# Patient Record
Sex: MALE | Race: WHITE | NOT HISPANIC OR LATINO | Employment: OTHER | URBAN - METROPOLITAN AREA
[De-identification: names, ages, dates, MRNs, and addresses within clinical notes are randomized per-mention and may not be internally consistent; named-entity substitution may affect disease eponyms.]

---

## 2017-05-01 ENCOUNTER — RECORDS - HEALTHEAST (OUTPATIENT)
Dept: LAB | Facility: CLINIC | Age: 72
End: 2017-05-01

## 2017-05-01 LAB
CHOLEST SERPL-MCNC: 146 MG/DL
FASTING STATUS PATIENT QL REPORTED: NORMAL
HDLC SERPL-MCNC: 42 MG/DL
LDLC SERPL CALC-MCNC: 74 MG/DL
TRIGL SERPL-MCNC: 148 MG/DL

## 2017-11-13 ENCOUNTER — RECORDS - HEALTHEAST (OUTPATIENT)
Dept: LAB | Facility: CLINIC | Age: 72
End: 2017-11-13

## 2017-11-13 LAB — PSA SERPL-MCNC: 1.1 NG/ML (ref 0–6.5)

## 2018-02-06 ENCOUNTER — RECORDS - HEALTHEAST (OUTPATIENT)
Dept: LAB | Facility: CLINIC | Age: 73
End: 2018-02-06

## 2018-02-06 LAB
ANION GAP SERPL CALCULATED.3IONS-SCNC: 10 MMOL/L (ref 5–18)
BUN SERPL-MCNC: 25 MG/DL (ref 8–28)
CALCIUM SERPL-MCNC: 10 MG/DL (ref 8.5–10.5)
CHLORIDE BLD-SCNC: 104 MMOL/L (ref 98–107)
CO2 SERPL-SCNC: 26 MMOL/L (ref 22–31)
CREAT SERPL-MCNC: 1.25 MG/DL (ref 0.7–1.3)
GFR SERPL CREATININE-BSD FRML MDRD: 56 ML/MIN/1.73M2
GLUCOSE BLD-MCNC: 156 MG/DL (ref 70–125)
POTASSIUM BLD-SCNC: 4.2 MMOL/L (ref 3.5–5)
SODIUM SERPL-SCNC: 140 MMOL/L (ref 136–145)

## 2018-05-07 ENCOUNTER — RECORDS - HEALTHEAST (OUTPATIENT)
Dept: LAB | Facility: CLINIC | Age: 73
End: 2018-05-07

## 2018-05-07 LAB
ANION GAP SERPL CALCULATED.3IONS-SCNC: 13 MMOL/L (ref 5–18)
BUN SERPL-MCNC: 28 MG/DL (ref 8–28)
CALCIUM SERPL-MCNC: 9.8 MG/DL (ref 8.5–10.5)
CHLORIDE BLD-SCNC: 104 MMOL/L (ref 98–107)
CHOLEST SERPL-MCNC: 162 MG/DL
CO2 SERPL-SCNC: 23 MMOL/L (ref 22–31)
CREAT SERPL-MCNC: 1.21 MG/DL (ref 0.7–1.3)
FASTING STATUS PATIENT QL REPORTED: YES
GFR SERPL CREATININE-BSD FRML MDRD: 59 ML/MIN/1.73M2
GLUCOSE BLD-MCNC: 93 MG/DL (ref 70–125)
HDLC SERPL-MCNC: 52 MG/DL
LDLC SERPL CALC-MCNC: 85 MG/DL
POTASSIUM BLD-SCNC: 4.7 MMOL/L (ref 3.5–5)
SODIUM SERPL-SCNC: 140 MMOL/L (ref 136–145)
TRIGL SERPL-MCNC: 124 MG/DL

## 2018-09-12 ENCOUNTER — RECORDS - HEALTHEAST (OUTPATIENT)
Dept: LAB | Facility: CLINIC | Age: 73
End: 2018-09-12

## 2018-09-12 LAB
ANION GAP SERPL CALCULATED.3IONS-SCNC: 12 MMOL/L (ref 5–18)
BUN SERPL-MCNC: 21 MG/DL (ref 8–28)
CALCIUM SERPL-MCNC: 10.4 MG/DL (ref 8.5–10.5)
CHLORIDE BLD-SCNC: 104 MMOL/L (ref 98–107)
CO2 SERPL-SCNC: 24 MMOL/L (ref 22–31)
CREAT SERPL-MCNC: 1.15 MG/DL (ref 0.7–1.3)
GFR SERPL CREATININE-BSD FRML MDRD: >60 ML/MIN/1.73M2
GLUCOSE BLD-MCNC: 75 MG/DL (ref 70–125)
POTASSIUM BLD-SCNC: 4.6 MMOL/L (ref 3.5–5)
SODIUM SERPL-SCNC: 140 MMOL/L (ref 136–145)

## 2019-07-03 ENCOUNTER — RECORDS - HEALTHEAST (OUTPATIENT)
Dept: LAB | Facility: CLINIC | Age: 74
End: 2019-07-03

## 2019-07-03 LAB
ALBUMIN SERPL-MCNC: 4.1 G/DL (ref 3.5–5)
ALP SERPL-CCNC: 41 U/L (ref 45–120)
ALT SERPL W P-5'-P-CCNC: 14 U/L (ref 0–45)
ANION GAP SERPL CALCULATED.3IONS-SCNC: 12 MMOL/L (ref 5–18)
AST SERPL W P-5'-P-CCNC: 15 U/L (ref 0–40)
BILIRUB SERPL-MCNC: 0.2 MG/DL (ref 0–1)
BUN SERPL-MCNC: 27 MG/DL (ref 8–28)
CALCIUM SERPL-MCNC: 9.9 MG/DL (ref 8.5–10.5)
CHLORIDE BLD-SCNC: 107 MMOL/L (ref 98–107)
CHOLEST SERPL-MCNC: 144 MG/DL
CO2 SERPL-SCNC: 24 MMOL/L (ref 22–31)
CREAT SERPL-MCNC: 1.28 MG/DL (ref 0.7–1.3)
FASTING STATUS PATIENT QL REPORTED: ABNORMAL
GFR SERPL CREATININE-BSD FRML MDRD: 55 ML/MIN/1.73M2
GLUCOSE BLD-MCNC: 92 MG/DL (ref 70–125)
HDLC SERPL-MCNC: 50 MG/DL
LDLC SERPL CALC-MCNC: 59 MG/DL
POTASSIUM BLD-SCNC: 4.3 MMOL/L (ref 3.5–5)
PROT SERPL-MCNC: 6.4 G/DL (ref 6–8)
PSA SERPL-MCNC: 0.9 NG/ML (ref 0–6.5)
SODIUM SERPL-SCNC: 143 MMOL/L (ref 136–145)
TRIGL SERPL-MCNC: 174 MG/DL

## 2019-10-15 ENCOUNTER — AMBULATORY - HEALTHEAST (OUTPATIENT)
Dept: PALLIATIVE MEDICINE | Facility: OTHER | Age: 74
End: 2019-10-15

## 2019-10-15 DIAGNOSIS — M70.61 TROCHANTERIC BURSITIS, RIGHT HIP: ICD-10-CM

## 2019-11-06 ENCOUNTER — RECORDS - HEALTHEAST (OUTPATIENT)
Dept: ADMINISTRATIVE | Facility: OTHER | Age: 74
End: 2019-11-06

## 2019-11-12 ENCOUNTER — COMMUNICATION - HEALTHEAST (OUTPATIENT)
Dept: PALLIATIVE MEDICINE | Facility: OTHER | Age: 74
End: 2019-11-12

## 2019-11-13 ENCOUNTER — HOSPITAL ENCOUNTER (OUTPATIENT)
Dept: PALLIATIVE MEDICINE | Facility: OTHER | Age: 74
Discharge: HOME | End: 2019-11-13
Attending: PAIN MEDICINE | Admitting: PAIN MEDICINE

## 2019-11-13 DIAGNOSIS — M79.18 MYOFASCIAL PAIN: ICD-10-CM

## 2019-11-13 DIAGNOSIS — M71.9 BURSITIS: ICD-10-CM

## 2019-11-13 RX ORDER — FEXOFENADINE HCL 180 MG/1
180 TABLET ORAL DAILY
Status: SHIPPED | COMMUNITY
Start: 2019-11-13

## 2019-11-13 RX ORDER — POLYETHYLENE GLYCOL 3350 17 G/17G
17 POWDER, FOR SOLUTION ORAL DAILY PRN
Status: SHIPPED | COMMUNITY
Start: 2019-11-13

## 2019-11-13 RX ORDER — LAMOTRIGINE 150 MG/1
150 TABLET ORAL DAILY
Status: SHIPPED | COMMUNITY
Start: 2019-11-13

## 2019-11-13 RX ORDER — TRAMADOL HYDROCHLORIDE 50 MG/1
50 TABLET ORAL EVERY 6 HOURS PRN
Status: SHIPPED | COMMUNITY
Start: 2019-11-13

## 2019-11-13 RX ORDER — LORAZEPAM 1 MG/1
1 TABLET ORAL DAILY PRN
Status: SHIPPED | COMMUNITY
Start: 2019-11-13

## 2019-11-13 RX ORDER — LISINOPRIL 5 MG/1
5 TABLET ORAL DAILY
Status: SHIPPED | COMMUNITY
Start: 2019-11-13

## 2019-11-13 RX ORDER — TRAZODONE HYDROCHLORIDE 100 MG/1
100 TABLET ORAL AT BEDTIME
Status: SHIPPED | COMMUNITY
Start: 2019-11-13

## 2019-11-13 RX ORDER — ROSUVASTATIN CALCIUM 20 MG/1
20 TABLET, COATED ORAL AT BEDTIME
Status: SHIPPED | COMMUNITY
Start: 2019-11-13

## 2019-11-13 RX ORDER — FENOFIBRATE 145 MG/1
145 TABLET, COATED ORAL DAILY
Status: SHIPPED | COMMUNITY
Start: 2019-11-13

## 2019-11-13 RX ORDER — DULOXETIN HYDROCHLORIDE 60 MG/1
60 CAPSULE, DELAYED RELEASE ORAL DAILY
Status: SHIPPED | COMMUNITY
Start: 2019-11-13

## 2019-11-13 ASSESSMENT — MIFFLIN-ST. JEOR: SCORE: 1554.54

## 2019-11-14 ENCOUNTER — COMMUNICATION - HEALTHEAST (OUTPATIENT)
Dept: PALLIATIVE MEDICINE | Facility: OTHER | Age: 74
End: 2019-11-14

## 2019-11-27 ENCOUNTER — COMMUNICATION - HEALTHEAST (OUTPATIENT)
Dept: PALLIATIVE MEDICINE | Facility: OTHER | Age: 74
End: 2019-11-27

## 2019-12-04 ENCOUNTER — HOSPITAL ENCOUNTER (OUTPATIENT)
Dept: PALLIATIVE MEDICINE | Facility: OTHER | Age: 74
Discharge: HOME | End: 2019-12-04
Attending: PAIN MEDICINE | Admitting: PAIN MEDICINE

## 2019-12-04 DIAGNOSIS — M16.9 OSTEOARTHRITIS OF HIP: ICD-10-CM

## 2019-12-04 DIAGNOSIS — M79.18 MYOFASCIAL PAIN SYNDROME: ICD-10-CM

## 2019-12-04 ASSESSMENT — MIFFLIN-ST. JEOR: SCORE: 1554.54

## 2019-12-05 ENCOUNTER — COMMUNICATION - HEALTHEAST (OUTPATIENT)
Dept: PALLIATIVE MEDICINE | Facility: OTHER | Age: 74
End: 2019-12-05

## 2019-12-12 ENCOUNTER — HOSPITAL ENCOUNTER (OUTPATIENT)
Dept: PALLIATIVE MEDICINE | Facility: OTHER | Age: 74
Discharge: HOME | End: 2019-12-12
Attending: FAMILY MEDICINE

## 2019-12-12 DIAGNOSIS — M70.61 TROCHANTERIC BURSITIS OF RIGHT HIP: ICD-10-CM

## 2019-12-12 DIAGNOSIS — G89.4 CHRONIC PAIN SYNDROME: ICD-10-CM

## 2019-12-12 DIAGNOSIS — Z98.890 S/P LUMBAR DISCECTOMY: ICD-10-CM

## 2019-12-12 RX ORDER — FAMOTIDINE 10 MG
10 TABLET ORAL 2 TIMES DAILY
Status: SHIPPED | COMMUNITY
Start: 2019-12-12

## 2019-12-12 RX ORDER — NAPROXEN SODIUM 220 MG
220 TABLET ORAL 2 TIMES DAILY WITH MEALS
Status: SHIPPED | COMMUNITY
Start: 2019-12-12

## 2019-12-12 ASSESSMENT — MIFFLIN-ST. JEOR: SCORE: 1554.54

## 2019-12-14 LAB
6MAM UR QL: NOT DETECTED
7AMINOCLONAZEPAM UR QL: NOT DETECTED
A-OH ALPRAZ UR QL: NOT DETECTED
ALPRAZ UR QL: NOT DETECTED
AMPHET UR QL SCN: NOT DETECTED
BARBITURATES UR QL: NOT DETECTED
BUPRENORPHINE UR QL: NOT DETECTED
BZE UR QL: NOT DETECTED
CARBOXYTHC UR QL: NOT DETECTED
CARISOPRODOL UR QL: NOT DETECTED
CLONAZEPAM UR QL: NOT DETECTED
CODEINE UR QL: NOT DETECTED
CREAT UR-MCNC: 101.8 MG/DL (ref 20–400)
DIAZEPAM UR QL: NOT DETECTED
ETHYL GLUCURONIDE UR QL: NOT DETECTED
FENTANYL UR QL: NOT DETECTED
HYDROCODONE UR QL: NOT DETECTED
HYDROMORPHONE UR QL: NOT DETECTED
LORAZEPAM UR QL: NOT DETECTED
MDA UR QL: NOT DETECTED
MDEA UR QL: NOT DETECTED
MDMA UR QL: NOT DETECTED
ME-PHENIDATE UR QL: NOT DETECTED
MEPERIDINE UR QL: NOT DETECTED
METHADONE UR QL: NOT DETECTED
METHAMPHET UR QL: NOT DETECTED
MIDAZOLAM UR QL SCN: NOT DETECTED
MORPHINE UR QL: NOT DETECTED
NORBUPRENORPHINE UR QL CFM: NOT DETECTED
NORDIAZEPAM UR QL: NOT DETECTED
NORFENTANYL UR QL: NOT DETECTED
NORHYDROCODONE UR QL CFM: NOT DETECTED
NOROXYCODONE UR QL CFM: NOT DETECTED
NOROXYMORPHONE UR QL SCN: NOT DETECTED
OXAZEPAM UR QL: NOT DETECTED
OXYCODONE UR QL: NOT DETECTED
OXYMORPHONE UR QL: NOT DETECTED
PATHOLOGY STUDY: NORMAL
PCP UR QL: NOT DETECTED
PHENTERMINE UR QL: NOT DETECTED
PROPOXYPH UR QL: NOT DETECTED
SERVICE CMNT-IMP: NORMAL
TAPENTADOL UR QL SCN: NOT DETECTED
TAPENTADOL UR QL SCN: NOT DETECTED
TEMAZEPAM UR QL: NOT DETECTED
TRAMADOL UR QL: PRESENT
ZOLPIDEM UR QL: NOT DETECTED

## 2019-12-30 ENCOUNTER — RECORDS - HEALTHEAST (OUTPATIENT)
Dept: ADMINISTRATIVE | Facility: OTHER | Age: 74
End: 2019-12-30

## 2020-01-02 ENCOUNTER — COMMUNICATION - HEALTHEAST (OUTPATIENT)
Dept: PALLIATIVE MEDICINE | Facility: OTHER | Age: 75
End: 2020-01-02

## 2020-01-07 ENCOUNTER — HOSPITAL ENCOUNTER (OUTPATIENT)
Dept: PALLIATIVE MEDICINE | Facility: OTHER | Age: 75
Discharge: HOME | End: 2020-01-07
Attending: PAIN MEDICINE | Admitting: PAIN MEDICINE

## 2020-01-07 DIAGNOSIS — M16.9 OSTEOARTHRITIS OF HIP: ICD-10-CM

## 2020-01-07 DIAGNOSIS — M46.1 SACROILIITIS (H): ICD-10-CM

## 2020-01-07 ASSESSMENT — MIFFLIN-ST. JEOR: SCORE: 1554.54

## 2020-01-08 ENCOUNTER — COMMUNICATION - HEALTHEAST (OUTPATIENT)
Dept: PALLIATIVE MEDICINE | Facility: OTHER | Age: 75
End: 2020-01-08

## 2020-01-15 ENCOUNTER — AMBULATORY - HEALTHEAST (OUTPATIENT)
Dept: PALLIATIVE MEDICINE | Facility: OTHER | Age: 75
End: 2020-01-15

## 2020-01-15 ENCOUNTER — RECORDS - HEALTHEAST (OUTPATIENT)
Dept: ADMINISTRATIVE | Facility: OTHER | Age: 75
End: 2020-01-15

## 2020-02-06 ENCOUNTER — HOSPITAL ENCOUNTER (OUTPATIENT)
Dept: PALLIATIVE MEDICINE | Facility: OTHER | Age: 75
Discharge: HOME | End: 2020-02-06
Attending: PHYSICIAN ASSISTANT

## 2020-02-06 DIAGNOSIS — M70.62 TROCHANTERIC BURSITIS OF BOTH HIPS: ICD-10-CM

## 2020-02-06 DIAGNOSIS — M79.18 MYALGIA, OTHER SITE: ICD-10-CM

## 2020-02-06 DIAGNOSIS — G89.4 CHRONIC PAIN SYNDROME: ICD-10-CM

## 2020-02-06 DIAGNOSIS — M70.61 TROCHANTERIC BURSITIS OF BOTH HIPS: ICD-10-CM

## 2020-02-06 ASSESSMENT — MIFFLIN-ST. JEOR: SCORE: 1577.22

## 2020-02-19 ENCOUNTER — HOSPITAL ENCOUNTER (OUTPATIENT)
Dept: PHYSICAL MEDICINE AND REHAB | Facility: CLINIC | Age: 75
Discharge: HOME | End: 2020-02-19
Attending: PHYSICIAN ASSISTANT

## 2020-02-19 DIAGNOSIS — M70.61 TROCHANTERIC BURSITIS OF BOTH HIPS: ICD-10-CM

## 2020-02-19 DIAGNOSIS — M70.62 TROCHANTERIC BURSITIS OF BOTH HIPS: ICD-10-CM

## 2020-02-19 DIAGNOSIS — M79.18 MYALGIA, OTHER SITE: ICD-10-CM

## 2020-02-20 ENCOUNTER — COMMUNICATION - HEALTHEAST (OUTPATIENT)
Dept: PALLIATIVE MEDICINE | Facility: OTHER | Age: 75
End: 2020-02-20

## 2020-07-09 ENCOUNTER — RECORDS - HEALTHEAST (OUTPATIENT)
Dept: LAB | Facility: CLINIC | Age: 75
End: 2020-07-09

## 2020-07-09 LAB
ALBUMIN SERPL-MCNC: 3.9 G/DL (ref 3.5–5)
ALP SERPL-CCNC: 58 U/L (ref 45–120)
ALT SERPL W P-5'-P-CCNC: 12 U/L (ref 0–45)
ANION GAP SERPL CALCULATED.3IONS-SCNC: 10 MMOL/L (ref 5–18)
AST SERPL W P-5'-P-CCNC: 17 U/L (ref 0–40)
BILIRUB SERPL-MCNC: 0.3 MG/DL (ref 0–1)
BUN SERPL-MCNC: 22 MG/DL (ref 8–28)
CALCIUM SERPL-MCNC: 9.8 MG/DL (ref 8.5–10.5)
CHLORIDE BLD-SCNC: 105 MMOL/L (ref 98–107)
CHOLEST SERPL-MCNC: 146 MG/DL
CO2 SERPL-SCNC: 26 MMOL/L (ref 22–31)
CREAT SERPL-MCNC: 1.44 MG/DL (ref 0.7–1.3)
FASTING STATUS PATIENT QL REPORTED: NORMAL
GFR SERPL CREATININE-BSD FRML MDRD: 48 ML/MIN/1.73M2
GLUCOSE BLD-MCNC: 96 MG/DL (ref 70–125)
HDLC SERPL-MCNC: 44 MG/DL
LDLC SERPL CALC-MCNC: 73 MG/DL
POTASSIUM BLD-SCNC: 4.6 MMOL/L (ref 3.5–5)
PROT SERPL-MCNC: 6.5 G/DL (ref 6–8)
PSA SERPL-MCNC: 1.5 NG/ML (ref 0–6.5)
SODIUM SERPL-SCNC: 141 MMOL/L (ref 136–145)
TRIGL SERPL-MCNC: 146 MG/DL

## 2021-05-25 ENCOUNTER — RECORDS - HEALTHEAST (OUTPATIENT)
Dept: ADMINISTRATIVE | Facility: CLINIC | Age: 76
End: 2021-05-25

## 2021-05-26 ENCOUNTER — RECORDS - HEALTHEAST (OUTPATIENT)
Dept: LAB | Facility: CLINIC | Age: 76
End: 2021-05-26

## 2021-05-26 LAB
ANION GAP SERPL CALCULATED.3IONS-SCNC: 11 MMOL/L (ref 5–18)
BUN SERPL-MCNC: 19 MG/DL (ref 8–28)
CALCIUM SERPL-MCNC: 9.4 MG/DL (ref 8.5–10.5)
CHLORIDE BLD-SCNC: 105 MMOL/L (ref 98–107)
CO2 SERPL-SCNC: 25 MMOL/L (ref 22–31)
CREAT SERPL-MCNC: 1.17 MG/DL (ref 0.7–1.3)
GFR SERPL CREATININE-BSD FRML MDRD: 60 ML/MIN/1.73M2
GLUCOSE BLD-MCNC: 99 MG/DL (ref 70–125)
POTASSIUM BLD-SCNC: 4.4 MMOL/L (ref 3.5–5)
SODIUM SERPL-SCNC: 141 MMOL/L (ref 136–145)

## 2021-05-27 ENCOUNTER — RECORDS - HEALTHEAST (OUTPATIENT)
Dept: ADMINISTRATIVE | Facility: CLINIC | Age: 76
End: 2021-05-27

## 2021-05-28 ENCOUNTER — RECORDS - HEALTHEAST (OUTPATIENT)
Dept: ADMINISTRATIVE | Facility: CLINIC | Age: 76
End: 2021-05-28

## 2021-05-29 ENCOUNTER — RECORDS - HEALTHEAST (OUTPATIENT)
Dept: ADMINISTRATIVE | Facility: CLINIC | Age: 76
End: 2021-05-29

## 2021-05-30 ENCOUNTER — RECORDS - HEALTHEAST (OUTPATIENT)
Dept: ADMINISTRATIVE | Facility: CLINIC | Age: 76
End: 2021-05-30

## 2021-05-31 ENCOUNTER — RECORDS - HEALTHEAST (OUTPATIENT)
Dept: ADMINISTRATIVE | Facility: CLINIC | Age: 76
End: 2021-05-31

## 2021-06-03 VITALS — BODY MASS INDEX: 30.13 KG/M2 | WEIGHT: 192 LBS | HEIGHT: 67 IN

## 2021-06-03 NOTE — PROGRESS NOTES
Injection - Other  Date/Time: 11/13/2019 9:25 AM  Performed by: Glenn Johnson MD  Authorized by: Glenn Johnson MD   Comments:     TRIGGER POINT INJECTION  Performed on: 11/13/2019    Mr. Ferguson is a 74-year-old man who has myofascial pain in the greater trochanteric bursa areas bilaterally, more on the right.  He is referred here today for trigger point injection.    Pre Procedure Diagnosis:  Myofascial pain  Vital Signs:  As per intra-procedure documentation    The procedure was discussed with Rayshawn Ferguson in detail along with the attendant risks, including but not limited to: nerve injury, infection, bleeding, allergic reaction, or worsening of pain.  Informed consent was obtained and patient elected to proceed.    After informed consent was obtained the patient was placed in a left lateral decubitus position on the examination table.  The right greater trochanter area was prepped sterilely with alcohol.  A one 1/2 inch #25 gauge needle was used.  There were injections made in a fanlike distribution over the right greater trochanter area.  Patient was then turned to a right lateral decubitus position in the left lateral hip area was prepped sterilely.  Injection was made again in a fanlike distribution over the left greater trochanter.  A total of 10 mL of 0.25% Marcaine with 10 mg of Decadron was used in divided doses.    The patient tolerated the procedure well.  There were no complications.      Pre Procedure Pain Scale: 8  Pain Score:   5(post procedure)    If Rayshawn Ferguson has any questions or concerns after discharge, he was instructed to call us.

## 2021-06-03 NOTE — PATIENT INSTRUCTIONS - HE
POST PROCEDURE INSTRUCTIONS  PROCEDURE DONE TODAY:TRIGGER POINT INJECTIONS    Rest today. Resume activity tomorrow as able.  Patient demonstrates the ability to ambulate independently with a steady gait at the time of discharge.      It is not unusual to have a temporary increase in your pain after a procedure. You can ice the area 24-48 hrs. 15 min at a time.      You received a steroid injection. This medication can take 2-7 days to take effect. Some temporary side effects include:    Heartburn    Flushed-red face    Insomnia-trouble sleeping-jitters    Diabetics may see a rise in blood sugar for a few days    Low back or SI joint (sacroiliac) injection: you may experience numbness in one or both legs. Please walk with help. This is temporary and will wear off in a few hours. Do not drive if you are tingling, numbness or weakness in your hands/arms or legs/feet.    Headache:  If you experience a headache after a lumbar epidural injection, lie down and drink fluids (especially caffeine). The headache will go away gradually. If it persists notify our clinic.    Fever: call if fever 100 or over, redness/warmth/swelling over the injection site.    No public hot tubs/pools for a couple days    Physical therapy: check with pain center provider regarding resuming therapy.    Monitor your response to the injection and report this at your next visit.    If you have been referred for a procedure only, please call your referring provider for a follow up.    IF YOU PLAN TO GET A FLU SHOT YOU MUST WAIT 2 WEEKS AFTER THIS INJECTION.      CALL IF ANY QUESTIONS [phone]

## 2021-06-03 NOTE — PATIENT INSTRUCTIONS - HE
POST PROCEDURE INSTRUCTIONS  PROCEDURE DONE TODAY: Trigger Point Injections to bilateral hip     Rest today. Resume activity tomorrow as able.  Patient demonstrates the ability to ambulate independently with a steady gait at the time of discharge.      It is not unusual to have a temporary increase in your pain after a procedure. You can ice the area 24-48 hrs. 15 min at a time.      You received a steroid injection. This medication can take 2-7 days to take effect. Some temporary side effects include:    Heartburn    Flushed-red face    Insomnia-trouble sleeping-jitters    Diabetics may see a rise in blood sugar for a few days    Low back or SI joint (sacroiliac) injection: you may experience numbness in one or both legs. Please walk with help. This is temporary and will wear off in a few hours. Do not drive if you are tingling, numbness or weakness in your hands/arms or legs/feet.    Headache:  If you experience a headache after a lumbar epidural injection, lie down and drink fluids (especially caffeine). The headache will go away gradually. If it persists notify our clinic.    Fever: call if fever 100 or over, redness/warmth/swelling over the injection site.    No public hot tubs/pools for a couple days    Physical therapy: check with pain center provider regarding resuming therapy.    Monitor your response to the injection and report this at your next visit.    If you have been referred for a procedure only, please call your referring provider for a follow up.    IF YOU PLAN TO GET A FLU SHOT YOU MUST WAIT 2 WEEKS AFTER THIS INJECTION.      CALL IF ANY QUESTIONS [phone]

## 2021-06-03 NOTE — PROGRESS NOTES
Patient her with bilateral hip with greater pain on right along with thigh. Will have a bilateral bursa injection today.

## 2021-06-04 ENCOUNTER — RECORDS - HEALTHEAST (OUTPATIENT)
Dept: ADMINISTRATIVE | Facility: CLINIC | Age: 76
End: 2021-06-04

## 2021-06-04 VITALS — BODY MASS INDEX: 30.85 KG/M2 | WEIGHT: 197 LBS

## 2021-06-04 VITALS — WEIGHT: 192 LBS | HEIGHT: 67 IN | BODY MASS INDEX: 30.13 KG/M2

## 2021-06-04 VITALS — HEIGHT: 67 IN | WEIGHT: 192 LBS | BODY MASS INDEX: 30.13 KG/M2

## 2021-06-04 VITALS — WEIGHT: 197 LBS | HEIGHT: 67 IN | BODY MASS INDEX: 30.92 KG/M2

## 2021-06-04 NOTE — PROGRESS NOTES
[facility]  New Patient Consultation        HPI  Rayshawn Ferguson 74 y.o. is here today, sent to me by  to discuss   Chief Complaint   Patient presents with     Hip Pain     Comorbid conditions include history of MI 1997 stent RCA, ASHD, HTN, IBS, duodenal ulcer, hyperipidemia, renal calculi, depression, L ankle fracture 05/2018, lumbar surgery.    Pain Story:     Reports pain location is bilateral hips and right sided is worse, duration over 1 year.  He reports he cannot lay on his side very long without pain.  He denies work or auto injury which started his pain.  He reports pain as constant, severe that is sharp, dull, aching, shooting, stabbing, localized, deep, superficial, pressure.  He reports pain interferes with daily activities, sleep, recreational activities, and concentration.    Hip pain is worse with walking and going down stairs, has to use left leg to step.  He states he broke his left ankle non-displaced and 6 weeks had walking boot and painful to step down, followed orthopedics and wonders if this was an aggravating factor. Denies knee or ankle joint pian.  He states x-ray and CT of right hip were negative.  He states he believes he has been assessed for leg length discrepancy with pelvic x-ray through ortho. He states he also wonders about his SI joint; he states in the morning his pain hurts in his lower back more on right side than left. He denies previous SI Joint injections.  He describes 3 previous lumbar surgeries, microdiscectomy believes at right L4-5 and L3-4 levels with Dr. Dotson and denies hardware or fusions.  He states he has numbness in bilateral 4th/5th toes, saw podiatrist and was given inserts.  He states when he is in certain positions, sitting, or doesn't wear shoes using outer part of his foot and toes they get numb.      He saw [facility] in July as he was unable to walk on his right leg.  Dr. Guerra at [facility] diagnosed him with  bursitis and was given injection which did help a little.  He has since had 2 other bursa injections with Dr. Johnson and is scheduled for repeat procedure in January.  He states he doesn't want to mask the pain with medications but rather figure out what is causing it.    Alleviating factors: Medications, sitting down  Aggravating factors: Walking, laying down, going upstairs, going downstairs, weather change  Pain level today: On a scale of 1-10, the patient rates their pain at a 7.  Pain ratings vary between 6/10 and 9/10.  Function Ratin meaning pain affects the following:     3 - Enjoy     4 - Work, Enjoy     5 - Active, Mood, Work, Enjoy     6 - Sleep, Active, Mood, Work, Enjoy     7 - Walk, Sleep Active, Mood, Work, Enjoy     8 - Relate, Walk, Sleep, Active, Mood, Work, Enjoy  Associated Symptoms: Denies weight loss, weight gain, swelling, fever/chills, bowel or bladder incontinence.  Previous Diagnostics & Treatments for Pain:  Surgery - Denies  Injections - Bilateral hip injections bursa 19 & 19 with Dr. Johnson.  He also had a couple through [facility] in July which allowed pain to reduce.  Gives about 40% pain reduction.   Multiple LESI injections [id number]  Imaging - see below MRI right hip and x-ray bilateral hips standing  Physical Therapy - Yes [facility] for bursa last year  Chiropractor/Acupuncture - Denies  Massage - Denies  TENS or bracing - Denies  Pain Psychology or biofeedback - Denies  Other - heat/ice, home exercise program at gym does track up to 1/2 mile, then treadmill 1/2 mile.  When he steps down from treadmill his right leg is painful.      Social History: Single, retired x-ray tech.  He reports living along in an apartment.  Has support from brother and sister.  Independent ADLs, painful to walk.  Denies use of AD except for when pain was severe in July he was using a crutch to ambulate.    Patient set goals today in the office to achieve with the pain centers help. They are:    1.  Pain relief  2. Sleeping on sides, stepping down a step with less pain    Past Medical History:   Diagnosis Date     Depression      Hyperlipidemia      Hypertension      Injection (erythema) 2019    Steroid injectiom in bilateral hps     Kidney stones      Kidney stones      Panic attacks      Past Surgical History:   Procedure Laterality Date     EYE SURGERY       SPINE SURGERY         Social  Family History   Problem Relation Age of Onset     Diabetes Mother      Depression Mother      Cancer Father      Diabetes Sister      Depression Sister      Depression Brother      Cancer Brother      Social History     Tobacco Use   Smoking Status Former Smoker     Social History     Substance and Sexual Activity   Alcohol Use Not on file     Social History     Substance and Sexual Activity   Drug Use Not on file     Social History     Substance and Sexual Activity   Sexual Activity Not on file       Chemical Dependency History: Patient Denies tobacco use quit in 1983 was smoking 2 ppd x 2 years, denies alcohol use, illicit substance use including THC.  Denies any legal issues related to substance use.    Psychiatric History:  Depression, anxiety, panic attacks. Taking Duloxetine 60 mg, lamotrigine 150 mg daily, trazodone, and lorazepam 1 mg prn anxiety.  Sees psychiatrist Dr. Henriquez and psychologist.  Denies any suicidal ideation.  Denies any hospitalizations for mental illness.    Pertinent Pain Medications:  He currently takes Aleve 220 mg prn, duloxetine 60 mg daily (does not report helpful for pain), tramadol 50 mg prn 2 tabs at night after exercising, #18 tabs for 30 days doesn't take during the day, oxycodone 5/325 mg prn last prescribed 05/2019, and lorazepam 1 mg prn for anxiety/panic attacks or if cannot sleep refills #30 which lasts every few months, trazodone 200 mg daily.     Previously tried medications:    NSAIDs: Ibuprofen, Aleve,     Acetaminophen: APAP    Antidepressants: effexor not  helpful    Gabapentinoids: Denies    Opioids: hydrocodone, oxycodone, tramadol helpful    Topicals: OTC biofreeze does give a little relief, sounds like Voltaren Gel     Supplements: denies    Muscle Relaxants: denies    Other: Medrol dose packs several different times don't help      Current Outpatient Medications:      aspirin 81 MG EC tablet, Take 81 mg by mouth daily., Disp: , Rfl:      cyanocobalamin 1000 MCG tablet, Take 1,000 mcg by mouth daily., Disp: , Rfl:      DULoxetine (CYMBALTA) 60 MG capsule, Take 60 mg by mouth daily., Disp: , Rfl:      fenofibrate (TRICOR) 145 MG tablet, Take 145 mg by mouth daily., Disp: , Rfl:      fexofenadine (ALLEGRA) 180 MG tablet, Take 180 mg by mouth daily., Disp: , Rfl:      fluticasone propionate (FLOVENT DISKUS) 50 mcg/actuation diskus inhaler, Inhale 1 puff 2 (two) times a day., Disp: , Rfl:      lamoTRIgine (LAMICTAL) 150 MG tablet, Take 150 mg by mouth daily., Disp: , Rfl:      lisinopril (PRINIVIL,ZESTRIL) 5 MG tablet, Take 5 mg by mouth daily., Disp: , Rfl:      LORazepam (ATIVAN) 1 MG tablet, Take 1 mg by mouth daily as needed for anxiety (only as needed)., Disp: , Rfl:      methylcellulose (CITRUCEL) Powd, Take 2 g by mouth., Disp: , Rfl:      polyethylene glycol (MIRALAX) 17 gram packet, Take 17 g by mouth daily as needed., Disp: , Rfl:      ranitidine (ZANTAC) 150 MG capsule, Take 150 mg by mouth., Disp: , Rfl:      rosuvastatin (CRESTOR) 20 MG tablet, Take 20 mg by mouth at bedtime., Disp: , Rfl:      traMADol (ULTRAM) 50 mg tablet, Take 50 mg by mouth every 6 (six) hours as needed for pain or other (2 daily)., Disp: , Rfl:      traZODone (DESYREL) 100 MG tablet, Take 100 mg by mouth at bedtime. Take 2 tablets one daily, Disp: , Rfl:       Review of Systems:  12 point systems were reviewed with pt as documented on pt health form of 12/10/2019. ROS was positive for difficulty sleeping, headache, glasses, cataracts, stuffiness, discharge, sinus pain, sore throat,  "snoring, change in appetite, constipation, leg cramping, muscle or joint pain, stiffness, back pain, anxiety, depression  the rest of the systems were pertinent negative.  (General, Cardiac, Pulmonary, Integumentary, Musculoskeletal, Neurological,GI, , GYN, Endocrine, Hematological and Psychological)    Exam  Vitals:    12/12/19 1000   BP: (!) 171/91   Patient Site: Right Arm   Patient Position: Sitting   Pulse: 73   Resp: 18   Weight: 192 lb (87.1 kg)   Height: 5' 7\" (1.702 m)       Constitutional-General:  Well nourished, well developed, well groomed, healthy appearing individual.  Weight is overweight, appears stated age and presents alone.  Psych-Mental Status: A & O in no acute distress. Speech is fluent. Thought process normal. Recent and remote memory are intact.  Attention span and concentration are normal. Displays appropriate mood and affect.   Lymph-cervical lymph system (anterior and posterior) without palpable nodules or tenderness throughout. Supraclavicular chain without palpable nodules or tenderness throughout.   Cardiovascular- Bilateral LE no edema noted.   Musckuloskeletal  Gait:  Gait is abnormal, slight antagia.  Ambulates independently.    Gross Motor: Toe walking, heel walking, and Romberg tests are normal.   Strength:  Bilateral upper and lower extremity strength testing (see below). No atrophy or tone abnormalities noted.   Muscles   Right  Left   Hip Flex   5 5  Hip Abd   5 5    Quads    5 5   Hamstrings   5 5  Dorsiflex   5 5  Plantarflex  5 5  Toe ext   5 5  Toe flex   5 5    Sensation:  No loss of sensation noted to light touch in both upper and lower extremities. No dermatomal abnormal distributions noted.  Spine ROM:  Cervical ROM flexion WNL without pain, extension WNL without pain, rotation right WNL without pain, rotation left WNL without pain. Lumbar ROM flexion WNL without pain, extension WNL without pain, lateral bending right and left WNL without pain.  Provocative Maneuvers: "  Sacroiliac tests including Sulaiman-Brad and stork test negative bilaterally.  , and knee provocative maneuvers are negative.Tinel's test negative bilaterally. Piriformis test negative bilaterally. Facet loading test negative bilaterally. ISLR test was negative bilaterally.   Spine Palpation:  Inspection and palpatory examination of the lumbar spine normal in appearance and non tender to palpation L1-S1 and bilateral SI joints.   Joints: Inspection and palpation examination of the upper/lower extremities is normal in appearance without deformity, ecchymosis, erythema, swelling.  AROM of bilateral hips is intact and WNL with flexion, extension, internal and external rotation, abduction, and adduction.  Tenderness is noted in the right and left greater trochanteric bursa.  Integumentary: no rashes or breaks in the skin, no open wounds. Exposed skin is clean, dry, intact to inspection and palpation.    Lab:  Last UDS on 12/10/2019 was taken today and is pending.      Imaging:  Requesting from [facility], unable to review today.    :  Dated 12/10/2019 was reviewed with the patient    Assessment :  After reviewing the patients chart and physical findings I agree that the patient would benefit from our pain center multidisciplinary treatment approach.   I have discussed with the  patient today the diagnosis of bilateral hip bursitis; patient also questions if he has other etiology of pain such as SI joint or lumbar radicular pain.  On exam, it appears to me a bursitis but will also review [facility] notes as they have further imaging of hip/pelvis and lumbar spine, although her patient report his hip imaging was negative.  It is complicated by the fact that patient has history of 3 lumbar surgeries. Reports he had lumbar MRI done last year, will review in records.  Denies red flag symptoms.  We reviewed the natural progression of this diagnosis.  We discussed possible benefits and detriments of physical therapy, integrative care  such as acupuncture/chiropractor, medication management, behavorial therapy, and other alternative treatments including supplements and diet.  We also discussed future possible treatment options in a stepwise fashion, including interventional pain procedures and injections and possible surgical referral if needed.      Interventions: I discussed risks versus benefits of repeating bursa injection versus trial of right SI joint injection.  Reviewed this procedure today in detail. He is scheduled on 01/02/20 with Dr. Johnson.  Physical Medicine and rehabilitations: Patient has done physical therapy evaluation and treatment for pain control, improvement of function, and assisting the patient to develop a daily routine to support achievement and, where necessary, readjustment of habits and roles according to the patient capacity and life situation.   He is attending gym for exercise regularly.  Medication Management: We discussed medication options to manage the patient. I review with him change in NSAID to meloxicam, diclofenac, etc.for 2-3 week trial to see if any benefit.  Do have to be cautious due to patient's previous cardiac history.  Also discuss other options such as topical compounded creams, supplements for anti-inflammatory properties, and even trial of gabapentin for chronic pain and interrupted sleep at night.  He is taking tramadol sparingly, if Dr. Rahman needs us to take that over will need to review UDT results collected today.    Behavioral Health: We discussed the body mind process of pain. We discussed the importance of cognitive behavioral therapy to help patient to control pain, address any psychiatric condition that may contribute to patient's experience of pain. The patient agreed continue with current behavioral health team including therapist and psychiatrist Dr. Henriquez.    The patient understands that opiate/controlled pain medication is not prescribed during the initial patient consultation  at the pain center. If the patient is on pain medications for chronic pain, the PCP or prescribing provider may choose  to prescribe until the patient presents for follow up at the pain center. Medication prescriptions provided by the pain center will depend on the UA results, safe prescribing practices established by the CDC guidelines and patient response to their current treatment regimen at the follow up visit.      Plan recommended today:    Follow up in 6-8 weeks with Roopa PACKER    Sign a Release of Information for [facility] so we can obtain your records (office notes, imaging) for our next visit    Continue your current regimen of alleviating factors as reviewed today    Please see your current provider for any refill of an opioid prescription; they may choose to provide a refill until we have your urine screen back. They will continue to manage your general health and have requested you see the pain center for pain management. Please discuss any health concerns with your primary care physician.    [place] Precautions are taken with every patient which includes a  report and drug screen. A urine drug screen will be taken today for baseline screening.     Behavioral Therapy- continue with current psychiatrist/therapist    Physical and Occupational Therapy- continue exercise at the gym    PM&R- may consider a referral to Brandon Mead at [facility] for evaluation    Injections- continue with Dr. Johnson as scheduled in January - consider right SI joint versus right hip joint    MTM visit with the pharmacist in the future may be helpful with any medication changes    Non-opoid medical management includes- discussion of different anti-inflammatory trial versus gabapentin trial to help with pain/sleep at night.  Discussed trial of compounding cream topically, supplements, and/or making changes to opioids if conservative options fail.     Education was provided today in regards to the patient's specific diagnosis      Diagnosis  1. Chronic pain syndrome    2. S/P lumbar discectomy    3. Trochanteric bursitis of right hip      Patient reminders:   Diagnostics: UDT/SWAB collected 12/12/2019 and results are pending.  UDT/SWAB:  Patient required a random Urine Drug Testing, due to the need to comply with [place] Model Policy Guidelines and CDC Guideline for the use of any controlled substances. This is to ensure that patient is compliant with treatment, and monitor for risks such as diversion, abuse, or any other aberrant behaviors. Patient is either being considered for or taking a controlled substance. Unexpected findings will be discussed and treatment decision may be adjusted. Testing is being implemented across the board randomly w/o bias related to age, race, gender, socioeconomic status or Rastafari affiliation.     SAFETY REMINDERS  No alcohol while taking controlled substances. Alcohol is not an illegal substance, it is unsafe to use in combination. It is a build up of substances in the body that can be extremely hazardous and may cause respirations to slow to a dangerous rate resulting in hospitalization, brain damage, or death.    Opioid medications have been associated with sharp rise in unintentional overdose and death.  Overdose is a condition characterized by the consumption in excess of a particular drug causing adverse effects. This can happen b/c you are sick, accidentally or intentionally took an extra dose, are on multiple medication that can interact. Someone took your medication and they are not use to the medication.  Symptoms of overdose include:   !breathing slow and shallow, erratic or not at all  !pinpoint pupils, hallucinations  !confusion  !muscle jerks, slack muscles   !extreme sleepiness or loss of alertness   !awake but not able to talk   !face pale or clammy, vomiting, for lighter skinned people, the skin tone turns bluish purple, for darker skinned people, it turns grayish or ashen   If in a  situation where overdose is a concern engage the emergency response system (dial 911).    In one study it was noted that 80% of unintentional overdoses occurred in people who were taking a combination of opioids and benzodiazepines.    Do not sell, loan, borrow or share your opioid medication with anyone. Deaths have occurred as a result of this practice. It is illegal and patients are being prosecuted.     Prevent unexpected access/loss of medication: Keep medication locked. Only carry what you need with you.    The patient agrees to the plan and has no further questions, if questions arise the patient knows to call [phone].     Thank you for this consult and opportunity to assist with this patients care.    Greater than 50% of this 60 minute visit spent in face to face discussion of patient pain symptoms, pain history and treatments, and recommendations for multidisciplinary care approaches including medications, PT/OT, Behavioral Health, integrative care, and interventional treatment options.    Roopa Burgos PA-C  [facility]   [address]  [place] [address]  Ph: [phone]  Fax: [phone]

## 2021-06-04 NOTE — PATIENT INSTRUCTIONS - HE
Plan recommended today:    Follow up in 6-8 weeks with Roopa PACKER    Sign a Release of Information for [facility] so we can obtain your records (office notes, imaging) for our next visit    Continue your current regimen of alleviating factors as reviewed today    Please see your current provider for any refill of an opioid prescription; they may choose to provide a refill until we have your urine screen back. They will continue to manage your general health and have requested you see the pain center for pain management. Please discuss any health concerns with your primary care physician.    [place] Precautions are taken with every patient which includes a  report and drug screen. A urine drug screen will be taken today for baseline screening.     Behavioral Therapy- continue with current psychiatrist/therapist    Physical and Occupational Therapy- continue exercise at the gym    PM&R- may consider a referral to Brandon Mead at [facility] for evaluation    Injections- continue with Dr. Johnson as scheduled in January - consider right SI joint versus right hip joint    MTM visit with the pharmacist in the future may be helpful with any medication changes    Non-opoid medical management includes- discussion of different anti-inflammatory trial versus gabapentin trial to help with pain/sleep at night.  Discussed trial of compounding cream topically, supplements, and/or making changes to opioids if conservative options fail.     Education was provided today in regards to the patient's specific diagnosis

## 2021-06-04 NOTE — PROGRESS NOTES
Patient presents to the clinic today for a follow up with Roopa Burgos PA-C regarding hip pain. Patient describes their pain as sharp, dull, shooting, and pressure. Her/His function score is 7.

## 2021-06-04 NOTE — PROGRESS NOTES
Injection - Other  Date/Time: 12/4/2019 10:10 AM  Performed by: Glenn Johnson MD  Authorized by: Glenn Johnson MD   Comments:     TRIGGER POINT INJECTION TROCHANTERIC BURSA BILATERAL  Performed on: 12/4/2019    Mr. Ferguson is a 74-year-old man with pain over the trochanteric bursa areas bilaterally, greater on the right.  He had injections done which gave him some benefit.  The pain has increased again and he wishes to repeat the injection.  If he does not get good relief of the right hip pain I would recommend a right hip joint injection.    Pre Procedure Diagnosis:  Myofascial pain  Vital Signs:  As per intra-procedure documentation    The procedure was discussed with Rayshawn Ferguson in detail along with the attendant risks, including but not limited to: nerve injury, infection, bleeding, allergic reaction, or worsening of pain.  Informed consent was obtained and patient elected to proceed.    After informed consent was obtained the patient was placed in a left lateral decubitus position on the examination table.  The right lateral hip area was prepped sterilely with alcohol.  A 1-1/2 inch number 25-gauge needle was used.  There were injections made in a fanlike distribution over the right greater trochanter.  The patient was then turned to a right lateral decubitus position and the left trochanteric area was prepped sterilely.  Injections were made again in a fanlike distribution over the left greater trochanter.  A total of 10 mL of 0.25% Marcaine with 10 mg of Decadron was used in divided doses.    The patient tolerated the procedure well.  There were no complications.      Pre Procedure Pain Scale: 6  Pain Score:   7(intermittant; achy pain )    If Rayshawn Ferguson has any questions or concerns after discharge, he was instructed to call us.

## 2021-06-05 NOTE — PATIENT INSTRUCTIONS - HE
POST PROCEDURE INSTRUCTIONS  PROCEDURE DONE TODAY:Bilateral SI Joint injection    Rest today. Resume activity tomorrow as able.  Patient demonstrates the ability to ambulate independently with a steady gait at the time of discharge.      It is not unusual to have a temporary increase in your pain after a procedure. You can ice the area 24-48 hrs. 15 min at a time.      You received a steroid injection. This medication can take 2-7 days to take effect. Some temporary side effects include:    Heartburn    Flushed-red face    Insomnia-trouble sleeping-jitters    Diabetics may see a rise in blood sugar for a few days    Low back or SI joint (sacroiliac) injection: you may experience numbness in one or both legs. Please walk with help. This is temporary and will wear off in a few hours. Do not drive if you are tingling, numbness or weakness in your hands/arms or legs/feet.    Headache:  If you experience a headache after a lumbar epidural injection, lie down and drink fluids (especially caffeine). The headache will go away gradually. If it persists notify our clinic.    Fever: call if fever 100 or over, redness/warmth/swelling over the injection site.    No public hot tubs/pools for a couple days    Physical therapy: check with pain center provider regarding resuming therapy.    Monitor your response to the injection and report this at your next visit.    If you have been referred for a procedure only, please call your referring provider for a follow up.    IF YOU PLAN TO GET A FLU SHOT YOU MUST WAIT 2 WEEKS AFTER THIS INJECTION.      CALL IF ANY QUESTIONS [phone]

## 2021-06-05 NOTE — PATIENT INSTRUCTIONS - HE
Continue current medication plan as prescribed.  The patient is given Toradol 30 mg IM today to treat moderate to severe pain.  Patient denies contraindications to use including allergy/hypersensitivity to NSAIDs, active peptic ulcer disease or history of GI bleeding, anticoagulant medication use, renal disease, pregnancy/nursing, or high risk CV disease.  We discussed potential side effects including but not limited to: abdominal pain, nausea, vomiting, heartburn, constipation/diarrhea, drowsiness, headaches, injection site pain, rash, dizziness, itching, sweating.  Take Toradol 10 mg tab 1 every 6 hours with food for pain for the next 5 days.  Do not take any other NSAIDs with this (aleve, motrin, ibuprofen, etc)  Recommend repeating ultrasound guided bilateral hip bursa injection with Dr[facility] - will order and central scheduling can set up the appointment.  If this is helpful, will continue this treatment every 3-4 months as needed.  If not helpful, call here and I recommend you have a consult with Dr. Brandon Mead [facility] at [facility].  Follow-up as needed with Roopa PACKER to evaluate the above plan of care.

## 2021-06-05 NOTE — PROGRESS NOTES
Received documents from [facility] - attempts at scanning were illegible, so in event that scanning fails again will document the review of records for follow-up visit on 02/06/2020:  Right L3-4 far lateral disk herniation with L3 radiculopathy 09/20/2018 right far lateral foraminal L3-4 microdiscectomy, decompression of right L3 nerve root, partial facetectomy, partial laminotomy.  After failure of steroid injection (05/17/18), ultrasound guided bursa injection 04/2018, and PRP for trochanteric bursitis.  Failed intra-articular right hip injection and right L5-S1 TFESI ( 07/24/2018) and right L3-4 TFESI (08/14/2018).  Previous L4-5 and left L5-S1 decompression.    MRI Right hip without contrast 05/01/2018:  Conclusion:  1. Mild right hip degenerative changes with broad-based blunting and attenuation of the labrum.  2. No stress reaction, osseous contusion, acute fracture or osteonecrosis.  3. Low-grade partial thickness tearing of the right common hamstring origin.    MR Lumbar Spine without Contrast 05/19/2018:  Conclusion: Multilevel disc and facet degeneration with dorsal decompression defects at L5-S1 and L4-5, and specific findings as follows:  1. 3-4 mm right lateral disc herniation at L3-4 with moderate foraminal narrowing an partial effacement of right L3 perineural fat.  2. Mild central and/or subarticular recess stenosis at L4-5 and L3-4 with a 3 mm central disc protrusion at L4-5 and degenerative spondylolisthesis at each level.  3. Moderate bilateral subarticular recess stenosis at L5-S1.  4. Mild bilateral foraminal stenosis at L5-S1 and L4-5  5. Comparison with 10/25/2013 shows that degenerative changes have progressed at multiple levels, the disc herniation on the right at L3-4 new in the interval.

## 2021-06-05 NOTE — PROGRESS NOTES
PAIN CENTER PROGRESS NOTE    Subjective:   Rayshawn Ferguson is a 74 y.o. male who presents for evaluation of bilateral hip joint pain.  See consult note 12/12/2019.    Major issues:  1. Chronic pain syndrome    2. Trochanteric bursitis of both hips    3. Myalgia, other site       Pain rating and description: 6-7/10 intermittent sharp hip pain, bilateral.  Function rated 6.    Radiation of pain: Denies  Gait disturbance: none reported, 1 recent fall (see below).  Associated symptoms: Numbness in bilateral toes 3-4-5, weakness in bilateral hips.  Pain at night.  Denies bowel or bladder incontinence, fever/chills, unexplained weight loss.  Exacerbating factors: Getting up from a sitting or laying position  Alleviating factors: sitting, laying on back  Functional Symptoms: Pain interferes with sleep, walking, work, ADLs, mood (anxious, helpless/hopeless).  Adverse effects of medications: Denies  Current treatment efficacy: Fair  Current treatment compliance: New to pain center.  No aberrant behaviors in chart.      Since the last Pain Center visit, the patient denies any use of anticoagulant medications. Denies any new diagnostic testing, new treatments or new medical conditions, any changes in medications, or any ED/urgent care visits.  States his pain has worsened due to recent fall this week.    States couple days ago was trying to change light in patio and fell down to the ground and couldn't get up due to hip pain.  He landed on knees.  He states right side has always been worse than left.  He has been taking Aleve, he tries not to take tramadol but if cannot sleep at night due to pain he lays on sides hurts and aches.  Tramadol dose is 100 mg before bedtime, doesn't repeat during the night.  Sitting for an hour is painful.  Heat/ice not helpful, OTC topical has tried, and NSAIDs.    SI joint injections on 01/07/2020 helped only for a couple of days. Could feel a slight improvement getting up from sitting  position.  Denies complications. Steroids cause insomnia night after.      We review his records from [facility]:  Received documents from [facility] - attempts at scanning were illegible, so in event that scanning fails again will document the review of records for follow-up visit on 02/06/2020:  Right L3-4 far lateral disk herniation with L3 radiculopathy 09/20/2018 right far lateral foraminal L3-4 microdiscectomy, decompression of right L3 nerve root, partial facetectomy, partial laminotomy.  After failure of steroid injection (05/17/18), ultrasound guided bursa injection 04/2018, and PRP for trochanteric bursitis.  Failed intra-articular right hip injection and right L5-S1 TFESI ( 07/24/2018) and right L3-4 TFESI (08/14/2018).  Previous L4-5 and left L5-S1 decompression.     MRI Right hip without contrast 05/01/2018:  Conclusion:  1. Mild right hip degenerative changes with broad-based blunting and attenuation of the labrum.  2. No stress reaction, osseous contusion, acute fracture or osteonecrosis.  3. Low-grade partial thickness tearing of the right common hamstring origin.     MR Lumbar Spine without Contrast 05/19/2018:  Conclusion: Multilevel disc and facet degeneration with dorsal decompression defects at L5-S1 and L4-5, and specific findings as follows:  1. 3-4 mm right lateral disc herniation at L3-4 with moderate foraminal narrowing an partial effacement of right L3 perineural fat.  2. Mild central and/or subarticular recess stenosis at L4-5 and L3-4 with a 3 mm central disc protrusion at L4-5 and degenerative spondylolisthesis at each level.  3. Moderate bilateral subarticular recess stenosis at L5-S1.  4. Mild bilateral foraminal stenosis at L5-S1 and L4-5  5. Comparison with 10/25/2013 shows that degenerative changes have progressed at multiple levels, the disc herniation on the right at L3-4 new in the interval.    We review the case review discussion from 02/06/2020:  Recommendations for PM&R consult  "with Dr. Brandon Mead, may evaluate for leg length discrepancy.  May evaluate for postural restoration therapy.    Possible trial with CollaGEN supplement through [facility] as had low grade partial thickness tear of hamstring origin.  Possible Frequency Specific Microcurrent therapy    Patient states he would like to pursue again the hip injection under ultrasound as that was the most beneficial; was painful during but gave several months of relief afterwards.   He does not wish to pursue the above recommendations unless that doesn't help.     Review of Systems  Constitutional: Sleep disturbance due to pain.  Denies fever, chills, night sweats, lethargy, weight loss, weight gain  Musculoskeletal: Positive for joint pain and recent fall. Denies muscle pain, back pain  Gastrointestional: Denies difficulty swallowing, change in appetite, abdominal pain, constipation, nausea, vomiting, diarrhea, GERD, fecal incontinence.  Genitourinary: Denies urinary incontinence, dysuria, hematuria, UTI, frequency, hesitancy, change in libido/erectile dysfunction.  Neurologic: Denies headaches, confusion, seizure, weakness, changes in balance, changes in speech.  Psychiatric: Denies depression, anxiety, memory loss, psychoses, suicidal ideation, substance use/abuse.       Objective:     Vitals:    02/06/20 1000   BP: 163/83   Pulse: 69   Weight: 197 lb (89.4 kg)   Height: 5' 7\" (1.702 m)   PainSc:   7   PainLoc: Hip     Physical Exam  Constitutional- General appearance: Normal.  Well developed, uncomfortable, within normal limits of ideal weight and appearance reflects stated age.  No acute distress or pain behaviors noted.   Psychiatric- Judgment and insight: Normal.  Speech: Normal rhythm.  Thought process: Normal.  No abnormal thoughts reported. Alert & Oriented to person, place, and time.  Recent and remote memory: Normal.  Mood and affect: Normal.  Observed mood: concerned.   Respiratory- Breathing is non-labored; normal " rhythm and rate.  Dermatologic- Skin clean, dry and intact to inspection and palpation.  Cardiovascular- Extremities warm and well perfused, no peripheral edema or varicosities.  Musculoskeletal- Gait and station: Normal.  Gait evaluation demonstrates ambulating independently.  Patient does have difficulty rising from a seated position.    Lab:  Last UDS on 12/12/19 was reviewed and was appropriate.    [facility] website reviewed on 02/06/2020 as expected    Imaging:  No new imaging.    Assessment:   Rayshawn Ferguson is a 74 y.o. male seen in clinic today for bilateral hip pain, presumed bursitis.  He does have minimal DJD changes on right hip MRI however did not benefit from intra-articular hip joint injection.  He has had lumbar surgeries as well, but denies current back pain.  Recent SI joint injection did not improve pain symptoms.  Discuss repeating US guided bilateral hip bursa injections as that gave most significant and longest duration of relief; [facility] be [facility] Dr[facility] Weiss for this as we do not have an ultrasound machine here.  He is also educated on other treatment options discussed in case review today.  Due to increased pain from recent fall, also offer toradol IM loading dose and 5 day oral course in place of his usual NSAID medications for pain control.  He will continue other medications as prescribed.  Patient has multiple questions today related to etiology and anatomy of pain generators which were answered to the best of my abilities.  I do think he would benefit from seeing PM&R physician.       Plan:   Continue current medication plan as prescribed.  The patient is given Toradol 30 mg IM today to treat moderate to severe pain.  Patient denies contraindications to use including allergy/hypersensitivity to NSAIDs, active peptic ulcer disease or history of GI bleeding, anticoagulant medication use, renal disease, pregnancy/nursing, or high risk CV disease.  We discussed  potential side effects including but not limited to: abdominal pain, nausea, vomiting, heartburn, constipation/diarrhea, drowsiness, headaches, injection site pain, rash, dizziness, itching, sweating.  Take Toradol 10 mg tab 1 every 6 hours with food for pain for the next 5 days.  Do not take any other NSAIDs with this (aleve, motrin, ibuprofen, etc)  Recommend repeating ultrasound guided bilateral hip bursa injection with Dr[facility] - will order and central scheduling can set up the appointment.  If this is helpful, will continue this treatment every 3-4 months as needed.  If not helpful, call here and I recommend you have a consult with Dr. Brandon Mead PM[facility] at [facility].  Follow-up as needed with Roopa PACKER to evaluate the above plan of care.    Greater than 50% of this 40 minute visit spent in face to face discussion of pain symptoms, previous and current pain treatments/diagnostics/interventions, use of anatomy images for pain etiology, and treatment recommendations.    Roopa Burgos PA-C  [facility]  [address]  [place] [address]  Ph: [phone]  Fax: [phone]

## 2021-06-06 NOTE — TELEPHONE ENCOUNTER
Patient calls, request to discuss plan of care with the provider, should he start physical therapy.  Patient is requesting for provider phone call.

## 2021-06-06 NOTE — TELEPHONE ENCOUNTER
Call placed to patient:  Recommendations offered per provider note.  Patient agrees to calling back to the clinic in 10 days or so.

## 2021-06-06 NOTE — TELEPHONE ENCOUNTER
"This was the plan last visit:  \"Recommend repeating ultrasound guided bilateral hip bursa injection with Dr. [facility] at [facility] - will order and central scheduling can set up the appointment.  If this is helpful, will continue this treatment every 3-4 months as needed.  If not helpful, call here and I recommend you have a consult with Dr. Brandon Mead [facility] at [facility].\"    He had the injection done yesterday, so it is too soon to evaluate full impact of the steroid procedure.  Please advise patient to allow steroid 7-10 days to evaluate pain reduction, and then can reach back out to me to see if next steps/referral to [facility] is indicated.  "

## 2021-06-06 NOTE — PATIENT INSTRUCTIONS - HE
DISCHARGE INSTRUCTIONS    During office hours (8:00 a.m.- 4:00 p.m.) questions or concerns may be answered  by calling [facility] Navigation Nurses at  [phone].  Messages received after hours will be returned the following business day.      In the case of an emergency, please dial 911 or seek assistance at the nearest Emergency Room/Urgent Care facility.     All Patients:    ? You may experience an increase in your symptoms for the first 2 days (It may take anywhere between 2 days- 2 weeks for the steroid to have maximum effect).    ? You may use ice on the injection site, as frequently as 20 minutes each hour if needed.    ? You may take your pain medicine.    ? You may continue taking your regular medication after your injection.     ? You may shower. No swimming, tub bath or hot tub for 48 hours.  You may remove your bandaid/bandage as soon as you are home.    ? You may resume light activities, as tolerated.    ? Resume your usual diet as tolerated.        POSSIBLE STEROID SIDE EFFECTS (If steroid/cortisone was used for your procedure)    -If you experience these symptoms, it should only last for a short period      Swelling of the legs                Skin redness (flushing)       Mouth (oral) irritation     Blood sugar (glucose) levels              Sweats                      Mood changes    Headache    Sleeplessness         POSSIBLE PROCEDURE SIDE EFFECTS  -Call the Spine Center if you are concerned    Increased Pain             Increased numbness/tingling        Nausea/Vomiting            Bruising/bleeding at site        Redness or swelling                                                Difficulty walking        Weakness             Fever greater than 100.5

## 2021-07-03 NOTE — ADDENDUM NOTE
Addendum Note by Dalia Weinberg CMA at 12/12/2019 10:00 AM     Author: Dalia Weinberg CMA Service: -- Author Type: Certified Medical Assistant    Filed: 12/17/2019  1:55 PM Date of Service: 12/12/2019 10:00 AM Status: Signed    : Dalia Weinberg CMA (Certified Medical Assistant)    Encounter addended by: aDlia Weinberg CMA on: 12/17/2019  1:55 PM      Actions taken: Charge Capture section accepted

## 2021-07-12 ENCOUNTER — LAB REQUISITION (OUTPATIENT)
Dept: LAB | Facility: CLINIC | Age: 76
End: 2021-07-12

## 2021-07-12 DIAGNOSIS — I10 ESSENTIAL (PRIMARY) HYPERTENSION: ICD-10-CM

## 2021-07-12 DIAGNOSIS — E78.5 HYPERLIPIDEMIA, UNSPECIFIED: ICD-10-CM

## 2021-07-12 DIAGNOSIS — Z12.5 ENCOUNTER FOR SCREENING FOR MALIGNANT NEOPLASM OF PROSTATE: ICD-10-CM

## 2021-07-12 PROCEDURE — 82040 ASSAY OF SERUM ALBUMIN: CPT | Performed by: FAMILY MEDICINE

## 2021-07-12 PROCEDURE — 36415 COLL VENOUS BLD VENIPUNCTURE: CPT | Performed by: FAMILY MEDICINE

## 2021-07-12 PROCEDURE — G0103 PSA SCREENING: HCPCS | Performed by: FAMILY MEDICINE

## 2021-07-12 PROCEDURE — 80061 LIPID PANEL: CPT | Performed by: FAMILY MEDICINE

## 2021-07-13 LAB
ALBUMIN SERPL-MCNC: 3.9 G/DL (ref 3.5–5)
ALP SERPL-CCNC: 62 U/L (ref 45–120)
ALT SERPL W P-5'-P-CCNC: 13 U/L (ref 0–45)
ANION GAP SERPL CALCULATED.3IONS-SCNC: 12 MMOL/L (ref 5–18)
AST SERPL W P-5'-P-CCNC: 16 U/L (ref 0–40)
BILIRUB SERPL-MCNC: 0.3 MG/DL (ref 0–1)
BUN SERPL-MCNC: 17 MG/DL (ref 8–28)
CALCIUM SERPL-MCNC: 9.9 MG/DL (ref 8.5–10.5)
CHLORIDE BLD-SCNC: 105 MMOL/L (ref 98–107)
CHOLEST SERPL-MCNC: 141 MG/DL
CO2 SERPL-SCNC: 25 MMOL/L (ref 22–31)
CREAT SERPL-MCNC: 1.18 MG/DL (ref 0.7–1.3)
FASTING STATUS PATIENT QL REPORTED: ABNORMAL
GFR SERPL CREATININE-BSD FRML MDRD: 59 ML/MIN/1.73M2
GLUCOSE BLD-MCNC: 105 MG/DL (ref 70–125)
HDLC SERPL-MCNC: 42 MG/DL
LDLC SERPL CALC-MCNC: 58 MG/DL
POTASSIUM BLD-SCNC: 4.2 MMOL/L (ref 3.5–5)
PROT SERPL-MCNC: 6.2 G/DL (ref 6–8)
PSA SERPL-MCNC: 0.82 UG/L (ref 0–6.5)
SODIUM SERPL-SCNC: 142 MMOL/L (ref 136–145)
TRIGL SERPL-MCNC: 206 MG/DL

## 2022-01-07 ENCOUNTER — LAB REQUISITION (OUTPATIENT)
Dept: LAB | Facility: CLINIC | Age: 77
End: 2022-01-07
Payer: COMMERCIAL

## 2022-01-07 DIAGNOSIS — I10 ESSENTIAL (PRIMARY) HYPERTENSION: ICD-10-CM

## 2022-01-07 DIAGNOSIS — Z01.812 ENCOUNTER FOR PREPROCEDURAL LABORATORY EXAMINATION: ICD-10-CM

## 2022-01-07 LAB
ANION GAP SERPL CALCULATED.3IONS-SCNC: 12 MMOL/L (ref 5–18)
BUN SERPL-MCNC: 19 MG/DL (ref 8–28)
CALCIUM SERPL-MCNC: 10.2 MG/DL (ref 8.5–10.5)
CHLORIDE BLD-SCNC: 103 MMOL/L (ref 98–107)
CO2 SERPL-SCNC: 23 MMOL/L (ref 22–31)
CREAT SERPL-MCNC: 1.16 MG/DL (ref 0.7–1.3)
GFR SERPL CREATININE-BSD FRML MDRD: 64 ML/MIN/1.73M2
GLUCOSE BLD-MCNC: 104 MG/DL (ref 70–125)
POTASSIUM BLD-SCNC: 4.7 MMOL/L (ref 3.5–5)
SODIUM SERPL-SCNC: 138 MMOL/L (ref 136–145)

## 2022-01-07 PROCEDURE — U0005 INFEC AGEN DETEC AMPLI PROBE: HCPCS | Mod: ORL | Performed by: PHYSICIAN ASSISTANT

## 2022-01-07 PROCEDURE — 80048 BASIC METABOLIC PNL TOTAL CA: CPT | Mod: ORL | Performed by: PHYSICIAN ASSISTANT

## 2022-01-08 LAB — SARS-COV-2 RNA RESP QL NAA+PROBE: NEGATIVE

## 2022-08-12 ENCOUNTER — LAB REQUISITION (OUTPATIENT)
Dept: LAB | Facility: CLINIC | Age: 77
End: 2022-08-12

## 2022-08-12 DIAGNOSIS — E78.5 HYPERLIPIDEMIA, UNSPECIFIED: ICD-10-CM

## 2022-08-12 DIAGNOSIS — Z12.5 ENCOUNTER FOR SCREENING FOR MALIGNANT NEOPLASM OF PROSTATE: ICD-10-CM

## 2022-08-12 DIAGNOSIS — I10 ESSENTIAL (PRIMARY) HYPERTENSION: ICD-10-CM

## 2022-08-12 LAB
ALBUMIN SERPL BCG-MCNC: 4.3 G/DL (ref 3.5–5.2)
ALP SERPL-CCNC: 56 U/L (ref 40–129)
ALT SERPL W P-5'-P-CCNC: 14 U/L (ref 10–50)
ANION GAP SERPL CALCULATED.3IONS-SCNC: 11 MMOL/L (ref 7–15)
AST SERPL W P-5'-P-CCNC: 23 U/L (ref 10–50)
BILIRUB SERPL-MCNC: 0.2 MG/DL
BUN SERPL-MCNC: 23.2 MG/DL (ref 8–23)
CALCIUM SERPL-MCNC: 9.7 MG/DL (ref 8.8–10.2)
CHLORIDE SERPL-SCNC: 104 MMOL/L (ref 98–107)
CHOLEST SERPL-MCNC: 147 MG/DL
CREAT SERPL-MCNC: 1.3 MG/DL (ref 0.67–1.17)
DEPRECATED HCO3 PLAS-SCNC: 26 MMOL/L (ref 22–29)
GFR SERPL CREATININE-BSD FRML MDRD: 56 ML/MIN/1.73M2
GLUCOSE SERPL-MCNC: 101 MG/DL (ref 70–99)
HDLC SERPL-MCNC: 46 MG/DL
LDLC SERPL CALC-MCNC: 72 MG/DL
NONHDLC SERPL-MCNC: 101 MG/DL
POTASSIUM SERPL-SCNC: 4.4 MMOL/L (ref 3.4–5.3)
PROT SERPL-MCNC: 6.3 G/DL (ref 6.4–8.3)
PSA SERPL-MCNC: 1.41 NG/ML (ref 0–6.5)
SODIUM SERPL-SCNC: 141 MMOL/L (ref 136–145)
TRIGL SERPL-MCNC: 147 MG/DL

## 2022-08-12 PROCEDURE — 80061 LIPID PANEL: CPT | Performed by: FAMILY MEDICINE

## 2022-08-12 PROCEDURE — G0103 PSA SCREENING: HCPCS | Performed by: FAMILY MEDICINE

## 2022-08-12 PROCEDURE — 80053 COMPREHEN METABOLIC PANEL: CPT | Performed by: FAMILY MEDICINE

## 2022-12-22 ENCOUNTER — HOSPITAL ENCOUNTER (EMERGENCY)
Facility: CLINIC | Age: 77
Discharge: HOME | End: 2022-12-22
Attending: EMERGENCY MEDICINE | Admitting: EMERGENCY MEDICINE
Payer: COMMERCIAL

## 2022-12-22 ENCOUNTER — APPOINTMENT (OUTPATIENT)
Dept: CT IMAGING | Facility: CLINIC | Age: 77
End: 2022-12-22
Payer: COMMERCIAL

## 2022-12-22 VITALS
WEIGHT: 185 LBS | TEMPERATURE: 97.6 F | HEIGHT: 67 IN | BODY MASS INDEX: 29.03 KG/M2 | HEART RATE: 83 BPM | SYSTOLIC BLOOD PRESSURE: 190 MMHG | RESPIRATION RATE: 18 BRPM | DIASTOLIC BLOOD PRESSURE: 106 MMHG | OXYGEN SATURATION: 97 %

## 2022-12-22 DIAGNOSIS — N20.0 NEPHROLITHIASIS: ICD-10-CM

## 2022-12-22 LAB
ALBUMIN SERPL-MCNC: 4.1 G/DL (ref 3.5–5)
ALBUMIN UR-MCNC: NEGATIVE MG/DL
ALP SERPL-CCNC: 53 U/L (ref 45–120)
ALT SERPL W P-5'-P-CCNC: 11 U/L (ref 0–45)
ANION GAP SERPL CALCULATED.3IONS-SCNC: 11 MMOL/L (ref 5–18)
APPEARANCE UR: CLEAR
AST SERPL W P-5'-P-CCNC: 20 U/L (ref 0–40)
BASOPHILS # BLD AUTO: 0.1 10E3/UL (ref 0–0.2)
BASOPHILS NFR BLD AUTO: 0 %
BILIRUB SERPL-MCNC: 0.3 MG/DL (ref 0–1)
BILIRUB UR QL STRIP: NEGATIVE
BUN SERPL-MCNC: 28 MG/DL (ref 8–28)
C REACTIVE PROTEIN LHE: 1.4 MG/DL (ref 0–?)
CALCIUM SERPL-MCNC: 9.4 MG/DL (ref 8.5–10.5)
CHLORIDE BLD-SCNC: 101 MMOL/L (ref 98–107)
CO2 SERPL-SCNC: 24 MMOL/L (ref 22–31)
COLOR UR AUTO: ABNORMAL
CREAT SERPL-MCNC: 1.77 MG/DL (ref 0.7–1.3)
EOSINOPHIL # BLD AUTO: 0.1 10E3/UL (ref 0–0.7)
EOSINOPHIL NFR BLD AUTO: 1 %
ERYTHROCYTE [DISTWIDTH] IN BLOOD BY AUTOMATED COUNT: 14.1 % (ref 10–15)
GFR SERPL CREATININE-BSD FRML MDRD: 39 ML/MIN/1.73M2
GLUCOSE BLD-MCNC: 157 MG/DL (ref 70–125)
GLUCOSE UR STRIP-MCNC: NEGATIVE MG/DL
HCT VFR BLD AUTO: 41.1 % (ref 40–53)
HGB BLD-MCNC: 13.5 G/DL (ref 13.3–17.7)
HGB UR QL STRIP: NEGATIVE
IMM GRANULOCYTES # BLD: 0.1 10E3/UL
IMM GRANULOCYTES NFR BLD: 1 %
KETONES UR STRIP-MCNC: NEGATIVE MG/DL
LEUKOCYTE ESTERASE UR QL STRIP: NEGATIVE
LYMPHOCYTES # BLD AUTO: 1 10E3/UL (ref 0.8–5.3)
LYMPHOCYTES NFR BLD AUTO: 7 %
MCH RBC QN AUTO: 28.5 PG (ref 26.5–33)
MCHC RBC AUTO-ENTMCNC: 32.8 G/DL (ref 31.5–36.5)
MCV RBC AUTO: 87 FL (ref 78–100)
MONOCYTES # BLD AUTO: 1.1 10E3/UL (ref 0–1.3)
MONOCYTES NFR BLD AUTO: 8 %
MUCOUS THREADS #/AREA URNS LPF: PRESENT /LPF
NEUTROPHILS # BLD AUTO: 12.3 10E3/UL (ref 1.6–8.3)
NEUTROPHILS NFR BLD AUTO: 83 %
NITRATE UR QL: NEGATIVE
NRBC # BLD AUTO: 0 10E3/UL
NRBC BLD AUTO-RTO: 0 /100
PH UR STRIP: 5.5 [PH] (ref 5–7)
PLATELET # BLD AUTO: 297 10E3/UL (ref 150–450)
POTASSIUM BLD-SCNC: 4.1 MMOL/L (ref 3.5–5)
PROT SERPL-MCNC: 7 G/DL (ref 6–8)
RBC # BLD AUTO: 4.74 10E6/UL (ref 4.4–5.9)
RBC URINE: 2 /HPF
SODIUM SERPL-SCNC: 136 MMOL/L (ref 136–145)
SP GR UR STRIP: 1.02 (ref 1–1.03)
UROBILINOGEN UR STRIP-MCNC: <2 MG/DL
WBC # BLD AUTO: 14.6 10E3/UL (ref 4–11)
WBC URINE: 1 /HPF

## 2022-12-22 PROCEDURE — 99285 EMERGENCY DEPT VISIT HI MDM: CPT | Mod: 25

## 2022-12-22 PROCEDURE — 81001 URINALYSIS AUTO W/SCOPE: CPT

## 2022-12-22 PROCEDURE — 96372 THER/PROPH/DIAG INJ SC/IM: CPT

## 2022-12-22 PROCEDURE — 80053 COMPREHEN METABOLIC PANEL: CPT

## 2022-12-22 PROCEDURE — 250N000011 HC RX IP 250 OP 636

## 2022-12-22 PROCEDURE — 85025 COMPLETE CBC W/AUTO DIFF WBC: CPT

## 2022-12-22 PROCEDURE — 36415 COLL VENOUS BLD VENIPUNCTURE: CPT

## 2022-12-22 PROCEDURE — 86140 C-REACTIVE PROTEIN: CPT

## 2022-12-22 PROCEDURE — 74176 CT ABD & PELVIS W/O CONTRAST: CPT

## 2022-12-22 RX ORDER — IBUPROFEN 200 MG
400 TABLET ORAL EVERY 6 HOURS
Qty: 56 TABLET | Refills: 0 | Status: SHIPPED | OUTPATIENT
Start: 2022-12-22 | End: 2022-12-29

## 2022-12-22 RX ORDER — DIMENHYDRINATE 50 MG
50 TABLET ORAL AT BEDTIME
Qty: 7 TABLET | Refills: 0 | Status: SHIPPED | OUTPATIENT
Start: 2022-12-22 | End: 2022-12-29

## 2022-12-22 RX ORDER — OXYCODONE HYDROCHLORIDE 5 MG/1
5 TABLET ORAL EVERY 4 HOURS PRN
Qty: 12 TABLET | Refills: 0 | Status: SHIPPED | OUTPATIENT
Start: 2022-12-22 | End: 2022-12-26

## 2022-12-22 RX ORDER — ONDANSETRON 4 MG/1
4 TABLET, ORALLY DISINTEGRATING ORAL ONCE
Status: COMPLETED | OUTPATIENT
Start: 2022-12-22 | End: 2022-12-22

## 2022-12-22 RX ORDER — KETOROLAC TROMETHAMINE 15 MG/ML
15 INJECTION, SOLUTION INTRAMUSCULAR; INTRAVENOUS ONCE
Status: DISCONTINUED | OUTPATIENT
Start: 2022-12-22 | End: 2022-12-22

## 2022-12-22 RX ORDER — DIMENHYDRINATE 50 MG
50 TABLET ORAL EVERY 6 HOURS PRN
Qty: 28 TABLET | Refills: 0 | Status: SHIPPED | OUTPATIENT
Start: 2022-12-22 | End: 2022-12-29

## 2022-12-22 RX ORDER — KETOROLAC TROMETHAMINE 15 MG/ML
15 INJECTION, SOLUTION INTRAMUSCULAR; INTRAVENOUS ONCE
Status: COMPLETED | OUTPATIENT
Start: 2022-12-22 | End: 2022-12-22

## 2022-12-22 RX ORDER — OXYCODONE HYDROCHLORIDE 5 MG/1
5 TABLET ORAL ONCE
Status: DISCONTINUED | OUTPATIENT
Start: 2022-12-22 | End: 2022-12-22 | Stop reason: HOSPADM

## 2022-12-22 RX ORDER — ACETAMINOPHEN 500 MG
1000 TABLET ORAL EVERY 6 HOURS
Qty: 56 TABLET | Refills: 0 | Status: SHIPPED | OUTPATIENT
Start: 2022-12-22 | End: 2022-12-29

## 2022-12-22 RX ADMIN — ONDANSETRON 4 MG: 4 TABLET, ORALLY DISINTEGRATING ORAL at 20:27

## 2022-12-22 RX ADMIN — KETOROLAC TROMETHAMINE 15 MG: 15 INJECTION, SOLUTION INTRAMUSCULAR; INTRAVENOUS at 20:26

## 2022-12-22 ASSESSMENT — ENCOUNTER SYMPTOMS
CHILLS: 0
HEMATURIA: 0
DIFFICULTY URINATING: 0
RHINORRHEA: 0
FLANK PAIN: 1
CONSTIPATION: 1
DYSURIA: 0
VOMITING: 0
COUGH: 0
CHEST TIGHTNESS: 0
BACK PAIN: 0
NAUSEA: 1
BLOOD IN STOOL: 0
ABDOMINAL PAIN: 0
SHORTNESS OF BREATH: 1
FEVER: 0
SORE THROAT: 0

## 2022-12-22 ASSESSMENT — ACTIVITIES OF DAILY LIVING (ADL): ADLS_ACUITY_SCORE: 35

## 2022-12-23 NOTE — ED PROVIDER NOTES
"Emergency Department Midlevel Supervisory Note     I personally saw the patient and performed a substantive portion of the visit including all aspects of the medical decision making.    ED Course:  8:00 PM Poonam Feng PA-C staffed patient with me. I agree with their assessment and plan of management, and I will see the patient.  9:30 PM I met with the patient to introduce myself, gather additional history, perform my initial exam, and discuss the plan.   9:42 PM We discussed the plan for discharge and the patient is agreeable. Reviewed supportive cares, symptomatic treatment, outpatient follow up, and reasons to return to the Emergency Department. All questions and concerns were addressed. Patient to be discharged by ED RN.      Brief HPI:     Rayshawn Ferguson is a 77 year old male who presents for evaluation of flank pain     The patient reports they have had \"constant\" right flank pain for about 8 hours. The patient notes a history of kidney stones and states that this pain feels similar to stones they have had in the past. The patient also complains of nausea, but denies vomiting. The patient took tramadol without adequate relief. The patient notes they had some testicular pain last night and some shortness of breath due to the flank pain. Also reports some increased urgency with urination. The patient also notes a history of constipation and states their last bowel movement was a few days ago. The patient denies any falls or trauma to the flank area. The patient denies cough, fever, chills, runny nose, sore throat, bloody stool, hematuria, dysuria, penile discharge, abdominal pain, chest pain, rashes, or any other symptoms or complaints.       I, Roopa Jeffrey am serving as a scribe to document services personally performed by Mayo Manzano MD, based on my observations and the provider's statements to me.   I, Mayo Manzano MD attest that Roopa Jeffrey was acting in a scribe capacity, has observed my " "performance of the services and has documented them in accordance with my direction.    Brief Physical Exam: BP (!) 190/106   Pulse 83   Temp 97.6  F (36.4  C) (Temporal)   Resp 18   Ht 1.702 m (5' 7\")   Wt 83.9 kg (185 lb)   SpO2 97%   BMI 28.98 kg/m      Vitals: BP (!) 190/106   Pulse 83   Temp 97.6  F (36.4  C) (Temporal)   Resp 18   Ht 1.702 m (5' 7\")   Wt 83.9 kg (185 lb)   SpO2 97%   BMI 28.98 kg/m    General: Appears in no acute distress, awake, alert, interactive.  Eyes: Conjunctivae non-injected. Sclera anicteric.  HENT: Atraumatic.  Neck: Supple.  Respiratory/Chest: Respiration unlabored.  Abdomen: non distended  Musculoskeletal: Normal extremities. No edema or erythema.  Skin: Normal color. No rash or diaphoresis.  Neurologic: Face symmetric, moves all extremities spontaneously. Speech clear.  Psychiatric: Oriented to person, place, and time. Affect appropriate.    MDM:      Right-sided flank pain likely renal colic.  Also considered shingles, biliary colic, appendicitis, AAA, diverticulitis    We will start with UA, pain management, labs, CT    Pain improved.  CT shows obstructing 3 mm proximal stone.  Plan for discharge home with pain medications and follow-up with kidney stone [place]       1. Nephrolithiasis        Labs and Imaging:  Results for orders placed or performed during the hospital encounter of 12/22/22   CT Abdomen Pelvis w/o Contrast    Impression    IMPRESSION:   1.  3 to 4 mm proximal right ureteric obstructing calculus.     Comprehensive metabolic panel   Result Value Ref Range    Sodium 136 136 - 145 mmol/L    Potassium 4.1 3.5 - 5.0 mmol/L    Chloride 101 98 - 107 mmol/L    Carbon Dioxide (CO2) 24 22 - 31 mmol/L    Anion Gap 11 5 - 18 mmol/L    Urea Nitrogen 28 8 - 28 mg/dL    Creatinine 1.77 (H) 0.70 - 1.30 mg/dL    Calcium 9.4 8.5 - 10.5 mg/dL    Glucose 157 (H) 70 - 125 mg/dL    Alkaline Phosphatase 53 45 - 120 U/L    AST 20 0 - 40 U/L    ALT 11 0 - 45 U/L    " Protein Total 7.0 6.0 - 8.0 g/dL    Albumin 4.1 3.5 - 5.0 g/dL    Bilirubin Total 0.3 0.0 - 1.0 mg/dL    GFR Estimate 39 (L) >60 mL/min/1.73m2   CRP inflammation   Result Value Ref Range    CRP 1.4 (H) 0.0 - <0.8 mg/dL   UA with Microscopic reflex to Culture    Specimen: Urine, Clean Catch   Result Value Ref Range    Color Urine Light Yellow Colorless, Straw, Light Yellow, Yellow    Appearance Urine Clear Clear    Glucose Urine Negative Negative mg/dL    Bilirubin Urine Negative Negative    Ketones Urine Negative Negative mg/dL    Specific Gravity Urine 1.025 1.001 - 1.030    Blood Urine Negative Negative    pH Urine 5.5 5.0 - 7.0    Protein Albumin Urine Negative Negative mg/dL    Urobilinogen Urine <2.0 <2.0 mg/dL    Nitrite Urine Negative Negative    Leukocyte Esterase Urine Negative Negative    Mucus Urine Present (A) None Seen /LPF    RBC Urine 2 <=2 /HPF    WBC Urine 1 <=5 /HPF   CBC with platelets and differential   Result Value Ref Range    WBC Count 14.6 (H) 4.0 - 11.0 10e3/uL    RBC Count 4.74 4.40 - 5.90 10e6/uL    Hemoglobin 13.5 13.3 - 17.7 g/dL    Hematocrit 41.1 40.0 - 53.0 %    MCV 87 78 - 100 fL    MCH 28.5 26.5 - 33.0 pg    MCHC 32.8 31.5 - 36.5 g/dL    RDW 14.1 10.0 - 15.0 %    Platelet Count 297 150 - 450 10e3/uL    % Neutrophils 83 %    % Lymphocytes 7 %    % Monocytes 8 %    % Eosinophils 1 %    % Basophils 0 %    % Immature Granulocytes 1 %    NRBCs per 100 WBC 0 <1 /100    Absolute Neutrophils 12.3 (H) 1.6 - 8.3 10e3/uL    Absolute Lymphocytes 1.0 0.8 - 5.3 10e3/uL    Absolute Monocytes 1.1 0.0 - 1.3 10e3/uL    Absolute Eosinophils 0.1 0.0 - 0.7 10e3/uL    Absolute Basophils 0.1 0.0 - 0.2 10e3/uL    Absolute Immature Granulocytes 0.1 <=0.4 10e3/uL    Absolute NRBCs 0.0 10e3/uL     I have reviewed the relevant laboratory and radiology studies    Procedures:  I was present for the key portions of this procedure:     Mayo Manzano MD  [facility] EMERGENCY ROOM  [id number]  [facility] [address]  [phone]     Andrey Manzano MD  12/22/22 2140

## 2022-12-23 NOTE — ED TRIAGE NOTES
Pt c/o of right flank pain since about 1100. He has not taken any medications PTA. Has hx of kidney stones and says it feels similar.      Triage Assessment     Row Name 12/22/22 1917       Triage Assessment (Adult)    Airway WDL WDL       Respiratory WDL    Respiratory WDL WDL       Skin Circulation/Temperature WDL    Skin Circulation/Temperature WDL WDL       Cardiac WDL    Cardiac WDL X  HTN       Peripheral/Neurovascular WDL    Peripheral Neurovascular WDL WDL       Cognitive/Neuro/Behavioral WDL    Cognitive/Neuro/Behavioral WDL WDL

## 2022-12-23 NOTE — ED PROVIDER NOTES
EMERGENCY DEPARTMENT ENCOUNTER      NAME: Rayshawn Ferguson  AGE: 77 year old male  YOB: 1945  MRN: [id number]  EVALUATION DATE & TIME: 2022  7:18 PM    PCP: Niels Rahman    ED PROVIDER: Poonam Feng PA-C      Chief Complaint   Patient presents with     Flank Pain         FINAL IMPRESSION:  1. Nephrolithiasis          ED COURSE & MEDICAL DECISION MAKIN:28 PM I met with the patient and performed my initial interview and exam.  8:16 PM Staffed patient with Dr. Manzano.  9:37 PM Reevaluated and updated patient. I discussed the plan for discharge with the patient, and patient is agreeable. We discussed supportive cares at home and reasons for return to the ER including new or worsening symptoms. All questions and concerns addressed. Patient to be discharged by RN.    Pertinent Labs & Imaging studies reviewed. (See chart for details)  77 year old male with a history of kidney stones and HLD presents to the Emergency Department for evaluation of right flank pain that developed earlier today. Upon exam, patient is afebrile, hypertensive, but in no acute distress. Relatively benign exam including no CVA tenderness to percussion. Differential diagnosis includes but not limited to nephrolithiasis, UTI, pyelonephritis, electrolyte abnormality, musculoskeletal etiology. Symptoms not consistent with appendicitis, diverticulitis, aortic dissection or AAA rupture. Based on patient's presenting symptoms, laboratories and imaging were ordered.    CBC with leukocytosis at 14.6. CMP revealed elevated Cr at 1.77, otherwise unremarkable. CRP elevated at 1.4. UA without infection. CT revealed 3-4 mm proximal right ureteric obstructing calculus.     Patient was treated with Toradol, Zofran, and Oxycodone upon arrival with moderate improvement in symptoms. Symptoms and workup most consistent with nephrolithiasis. Patient was made aware of the above findings. Patient's pain has been manageable; therefore,  using shared decision making plan to discharge patient home with symptomatic management with close follow-up with urology. The patient was stable and well appearing throughout entire ER visit and upon discharge. The patient was advised to return to the ER if any new or worsening symptoms develop. The patient verbalizes understanding and agrees with the plan. At the conclusion of the encounter I discussed the results of all of the tests and the disposition. The questions were answered. The patient or family acknowledged understanding and was agreeable with the care plan.     Medical Decision Making    History:    Supplemental history from: Documented in HPI, if applicable    External Record(s) reviewed: Documented in HPI, if applicable.    Work Up:    Chart documentation includes differential considered and any EKGs or imaging independently interpreted by provider.    In additional to work up documented, I considered the following work up: See chart documentation, if applicable.    External consultation:    Discussion of management with another provider: See chart documentation, if applicable    Complicating factors:    Care impacted by chronic illness: Hyperlipidemia    Care affected by social determinants of health: N/A    Disposition considerations: Discharge. I prescribed additional prescription strength medication(s) as charted. I considered admission, but discharged the patient after share decision making conversation.      MEDICATIONS GIVEN IN THE EMERGENCY:  Medications   oxyCODONE (ROXICODONE) tablet 5 mg (has no administration in time range)   ondansetron (ZOFRAN ODT) ODT tab 4 mg (4 mg Oral Given 12/22/22 2027)   ketorolac (TORADOL) injection 15 mg (15 mg Intramuscular Given 12/22/22 2026)       NEW PRESCRIPTIONS STARTED AT TODAY'S ER VISIT  New Prescriptions    ACETAMINOPHEN (TYLENOL) 500 MG TABLET    Take 2 tablets (1,000 mg) by mouth every 6 hours for 7 days    DIMENHYDRINATE (DRAMAMINE) 50 MG TABLET     "Take 1 tablet (50 mg) by mouth At Bedtime for 7 days    DIMENHYDRINATE (DRAMAMINE) 50 MG TABLET    Take 1 tablet (50 mg) by mouth every 6 hours as needed for other (kidney stone pain management)    IBUPROFEN (ADVIL/MOTRIN) 200 MG TABLET    Take 2 tablets (400 mg) by mouth every 6 hours for 7 days    OXYCODONE (ROXICODONE) 5 MG TABLET    Take 1 tablet (5 mg) by mouth every 4 hours as needed for severe pain (7-10) If pain is not improved with acetaminophen and ibuprofen.          =================================================================    HPI    Patient information was obtained from: Patient     Use of : N/A       Rayshawn Ferguson is a 77 year old male with a pertinent history of kidney stones and HLD who presents to this ED via walk-in for evaluation of flank pain    The patient reports they have had \"constant\" right flank pain for about 8 hours. The patient notes a history of kidney stones and states that this pain feels similar to stones they have had in the past. The patient also complains of nausea, but denies vomiting. The patient took tramadol without adequate relief. The patient notes they had some testicular pain last night and some shortness of breath due to the flank pain. The patient also reports some increased urgency with urination and that they almost didn't make it to the bathroom last night.The patient also notes a history of constipation and states their last bowel movement was a few days ago. The patient denies any falls or trauma to the flank area. The patient denies cough, fever, chills, runny nose, sore throat, bloody stool, hematuria, dysuria, penile discharge, abdominal pain, chest pain, rashes, or any other symptoms or complaints.       REVIEW OF SYSTEMS   Review of Systems   Constitutional: Negative for chills and fever.   HENT: Negative for rhinorrhea and sore throat.    Respiratory: Positive for shortness of breath (mild, secondary to pain). Negative for cough and " chest tightness.    Cardiovascular: Negative for chest pain and leg swelling.   Gastrointestinal: Positive for constipation (chronic) and nausea. Negative for abdominal pain, blood in stool and vomiting.   Genitourinary: Positive for flank pain (right side), testicular pain and urgency. Negative for difficulty urinating, dysuria, hematuria, penile discharge, penile swelling and scrotal swelling.   Musculoskeletal: Negative for back pain.   Skin: Negative for rash.   All other systems reviewed and are negative.    PAST MEDICAL HISTORY:  Past Medical History:   Diagnosis Date     Depression      Hyperlipidemia      Hypertension      Injection (erythema) 2019    Steroid injectiom in bilateral hps     Kidney stones      Kidney stones      Panic attacks        PAST SURGICAL HISTORY:  Past Surgical History:   Procedure Laterality Date     EYE SURGERY       IR LUMBAR EPIDURAL STEROID INJECTION  8/25/2003     IR LUMBAR EPIDURAL STEROID INJECTION  9/10/2003     IR LUMBAR EPIDURAL STEROID INJECTION  1/13/2005     IR LUMBAR EPIDURAL STEROID INJECTION  9/8/2005     IR LUMBAR EPIDURAL STEROID INJECTION  1/8/2007     IR LUMBAR EPIDURAL STEROID INJECTION  10/11/2007     IR LUMBAR EPIDURAL STEROID INJECTION  8/28/2012     SPINE SURGERY             CURRENT MEDICATIONS:    acetaminophen (TYLENOL) 500 MG tablet  dimenhyDRINATE (DRAMAMINE) 50 MG tablet  dimenhyDRINATE (DRAMAMINE) 50 MG tablet  ibuprofen (ADVIL/MOTRIN) 200 MG tablet  oxyCODONE (ROXICODONE) 5 MG tablet  aspirin 81 MG EC tablet  DULoxetine (CYMBALTA) 60 MG capsule  famotidine (FOR PEPCID) 10 MG tablet  fenofibrate (TRICOR) 145 MG tablet  fexofenadine (ALLEGRA) 180 MG tablet  fluticasone propionate (FLOVENT DISKUS) 50 mcg/actuation diskus inhaler  lamoTRIgine (LAMICTAL) 150 MG tablet  lisinopril (PRINIVIL,ZESTRIL) 5 MG tablet  LORazepam (ATIVAN) 1 MG tablet  naproxen sodium (ALEVE) 220 MG tablet  polyethylene glycol (MIRALAX) 17 gram packet  ranitidine (ZANTAC MAXIMUM  "STRENGTH) 150 MG tablet  rosuvastatin (CRESTOR) 20 MG tablet  traMADol (ULTRAM) 50 mg tablet  traZODone (DESYREL) 100 MG tablet        ALLERGIES:  Allergies   Allergen Reactions     Other Environmental Allergy Unknown     DUST       FAMILY HISTORY:  Family History   Problem Relation Age of Onset     Diabetes Mother      Depression Mother      Cancer Father      Diabetes Sister      Depression Sister      Depression Brother      Cancer Brother        SOCIAL HISTORY:   Social History     Socioeconomic History     Marital status: Single     Spouse name: None     Number of children: None     Years of education: None     Highest education level: None   Tobacco Use     Smoking status: Former     Years: 20.00     Types: Cigarettes     Quit date: 1983     Years since quittin.0     Smokeless tobacco: Never   Substance and Sexual Activity     Alcohol use: Not Currently     Sexual activity: Not Currently       VITALS:  BP (!) 190/106   Pulse 83   Temp 97.6  F (36.4  C) (Temporal)   Resp 18   Ht 1.702 m (5' 7\")   Wt 83.9 kg (185 lb)   SpO2 97%   BMI 28.98 kg/m      PHYSICAL EXAM    Constitutional:  Alert, in no acute distress. Cooperative.  EYES: PERRL. EOM intact. Conjunctivae clear.   HENT:  Atraumatic, normocephalic. Normal nose. Oropharynx without erythema or swelling. Moist membranes.  Respiratory:  Respirations even, unlabored, in no acute respiratory distress. Lungs clear to auscultation bilaterally without wheeze, rhonchi, or rales. No cough. Speaks in full sentences easily.  Cardiovascular:  Regular rate and rhythm, good peripheral perfusion. No peripheral edema. No chest wall tenderness.  GI: Soft, flat, nondistended, no tenderness to palpation, no guarding, rebound, or peritoneal signs. Bowel sounds normal. No CVA tenderness to percussion.  Musculoskeletal:  No edema. No cyanosis. Range of motion major extremities intact. No CTLS tenderness to palpation.   Integument: Warm, Dry, No erythema, No rash. "   Neurologic:  Alert & oriented. No focal deficits noted.   Psych: Normal mood and affect.      LAB:  All pertinent labs reviewed and interpreted.  Results for orders placed or performed during the hospital encounter of 12/22/22   CT Abdomen Pelvis w/o Contrast    Impression    IMPRESSION:   1.  3 to 4 mm proximal right ureteric obstructing calculus.     Comprehensive metabolic panel   Result Value Ref Range    Sodium 136 136 - 145 mmol/L    Potassium 4.1 3.5 - 5.0 mmol/L    Chloride 101 98 - 107 mmol/L    Carbon Dioxide (CO2) 24 22 - 31 mmol/L    Anion Gap 11 5 - 18 mmol/L    Urea Nitrogen 28 8 - 28 mg/dL    Creatinine 1.77 (H) 0.70 - 1.30 mg/dL    Calcium 9.4 8.5 - 10.5 mg/dL    Glucose 157 (H) 70 - 125 mg/dL    Alkaline Phosphatase 53 45 - 120 U/L    AST 20 0 - 40 U/L    ALT 11 0 - 45 U/L    Protein Total 7.0 6.0 - 8.0 g/dL    Albumin 4.1 3.5 - 5.0 g/dL    Bilirubin Total 0.3 0.0 - 1.0 mg/dL    GFR Estimate 39 (L) >60 mL/min/1.73m2   CRP inflammation   Result Value Ref Range    CRP 1.4 (H) 0.0 - <0.8 mg/dL   UA with Microscopic reflex to Culture    Specimen: Urine, Clean Catch   Result Value Ref Range    Color Urine Light Yellow Colorless, Straw, Light Yellow, Yellow    Appearance Urine Clear Clear    Glucose Urine Negative Negative mg/dL    Bilirubin Urine Negative Negative    Ketones Urine Negative Negative mg/dL    Specific Gravity Urine 1.025 1.001 - 1.030    Blood Urine Negative Negative    pH Urine 5.5 5.0 - 7.0    Protein Albumin Urine Negative Negative mg/dL    Urobilinogen Urine <2.0 <2.0 mg/dL    Nitrite Urine Negative Negative    Leukocyte Esterase Urine Negative Negative    Mucus Urine Present (A) None Seen /LPF    RBC Urine 2 <=2 /HPF    WBC Urine 1 <=5 /HPF   CBC with platelets and differential   Result Value Ref Range    WBC Count 14.6 (H) 4.0 - 11.0 10e3/uL    RBC Count 4.74 4.40 - 5.90 10e6/uL    Hemoglobin 13.5 13.3 - 17.7 g/dL    Hematocrit 41.1 40.0 - 53.0 %    MCV 87 78 - 100 fL    MCH 28.5  26.5 - 33.0 pg    MCHC 32.8 31.5 - 36.5 g/dL    RDW 14.1 10.0 - 15.0 %    Platelet Count 297 150 - 450 10e3/uL    % Neutrophils 83 %    % Lymphocytes 7 %    % Monocytes 8 %    % Eosinophils 1 %    % Basophils 0 %    % Immature Granulocytes 1 %    NRBCs per 100 WBC 0 <1 /100    Absolute Neutrophils 12.3 (H) 1.6 - 8.3 10e3/uL    Absolute Lymphocytes 1.0 0.8 - 5.3 10e3/uL    Absolute Monocytes 1.1 0.0 - 1.3 10e3/uL    Absolute Eosinophils 0.1 0.0 - 0.7 10e3/uL    Absolute Basophils 0.1 0.0 - 0.2 10e3/uL    Absolute Immature Granulocytes 0.1 <=0.4 10e3/uL    Absolute NRBCs 0.0 10e3/uL       RADIOLOGY:  Reviewed all pertinent imaging. Please see official radiology report.  CT Abdomen Pelvis w/o Contrast   Final Result   IMPRESSION:    1.  3 to 4 mm proximal right ureteric obstructing calculus.           I, Gemma Rodriguez, am serving as a scribe to document services personally performed by Poonam Feng PA-C based on my observation and the provider's statements to me. I, Poonam Feng PA-C, attest that Gemma Rodriguez is acting in a scribe capacity, has observed my performance of the services and has documented them in accordance with my direction.    Poonam Feng PA-C  [facility] EMERGENCY ROOM  [id number] [facility] [id number] [phone]     Poonam Feng PA-C  12/22/22 5304

## 2022-12-26 ENCOUNTER — VIRTUAL VISIT (OUTPATIENT)
Dept: UROLOGY | Facility: CLINIC | Age: 77
End: 2022-12-26
Payer: COMMERCIAL

## 2022-12-26 DIAGNOSIS — N20.0 NEPHROLITHIASIS: ICD-10-CM

## 2022-12-26 DIAGNOSIS — N20.0 CALCULUS OF KIDNEY: ICD-10-CM

## 2022-12-26 DIAGNOSIS — N20.1 CALCULUS OF URETER: Primary | ICD-10-CM

## 2022-12-26 PROCEDURE — 99203 OFFICE O/P NEW LOW 30 MIN: CPT | Mod: 95 | Performed by: UROLOGY

## 2022-12-26 ASSESSMENT — PAIN SCALES - GENERAL: PAINLEVEL: NO PAIN (0)

## 2022-12-26 NOTE — PROGRESS NOTES
Assessment/Plan:    Assessment & Plan   Rayshawn was seen today for new patient.    Diagnoses and all orders for this visit:    Calculus of ureter  -     Patient Stated Goal: Prevent further stones    Nephrolithiasis  -     Adult Urology Referral    Calculus of kidney  -     Patient Stated Goal: Prevent further stones        Stone Management Plan  Stone Management 12/26/2022   Urinary Tract Infection No suspicion of infection   Renal Colic Asymptomatic at this time   Renal Failure No suspicion of renal failure   Current CT date 12/22/2022   Right sided stones? Yes   R Number of ureteral stones 1   R GSD of ureteral stones 4   R Location of ureteral stone Proximal   R Number of kidney stones  2   R GSD of kidney stones 2 - 4   R Hydronephrosis Mild   R Stone Event New stone passed prior to visit   Diagnosis date 12/22/2022   Initial location of primary symptomatic stone Proximal   Initial GSD of primary symptomatic stone 4   Resolved Date 12/24/2022   R Current Plan Observe   Left sided stones? No   L Stone Event No current event           PLAN    Apears to have passed 4 mm right proximal ureteral stone. Will follow as necessary.    Phone call duration: 10 minutes  Distant site (provider site): Clinic  15 minutes spent on the date of the encounter doing chart review, history and exam, documentation and further activities per the note    ANDRZEJ HENNING MD  [facility]    Subjective:     HPI  Mr. Rayshawn Ferguson is a 77 year old  male who is being evaluated via a billable telephone visit by [facility] [place] new stone episode.    He is a remotely recurrent  stone former who has not required stone clearance procedures. He has not previously participated in stone risk evaluation. He has no identified modifiable stone risk factors. He has no identified non-modifiable stone risk factors.    Presented to ED with severe sudden onset right flank pain.  Thinks he passed a stone 2 days later. Now pain free. Had some chills with pain but no fever. Passed a previous stone about 40 years ago.    CT scan is personally reviewed and demonstrates a 4 mm right proximal ureteral stone and two smaller right peripheral renal stones..    Significant labs from presentation below.    Gives a good narrative for having passed the stone. If symptoms recur, he should repeat a CT. Will follow PRN.    ROS   Review of systems is negative except for HPI    Past Medical History:   Diagnosis Date     Depression      Hyperlipidemia      Hypertension      Injection (erythema) 2019    Steroid injectiom in bilateral hps     Kidney stones      Kidney stones      Panic attacks        Past Surgical History:   Procedure Laterality Date     EYE SURGERY       IR LUMBAR EPIDURAL STEROID INJECTION  8/25/2003     IR LUMBAR EPIDURAL STEROID INJECTION  9/10/2003     IR LUMBAR EPIDURAL STEROID INJECTION  1/13/2005     IR LUMBAR EPIDURAL STEROID INJECTION  9/8/2005     IR LUMBAR EPIDURAL STEROID INJECTION  1/8/2007     IR LUMBAR EPIDURAL STEROID INJECTION  10/11/2007     IR LUMBAR EPIDURAL STEROID INJECTION  8/28/2012     SPINE SURGERY         Current Outpatient Medications   Medication Sig Dispense Refill     acetaminophen (TYLENOL) 500 MG tablet Take 2 tablets (1,000 mg) by mouth every 6 hours for 7 days 56 tablet 0     aspirin 81 MG EC tablet [ASPIRIN 81 MG EC TABLET] Take 81 mg by mouth daily.       dimenhyDRINATE (DRAMAMINE) 50 MG tablet Take 1 tablet (50 mg) by mouth At Bedtime for 7 days 7 tablet 0     dimenhyDRINATE (DRAMAMINE) 50 MG tablet Take 1 tablet (50 mg) by mouth every 6 hours as needed for other (kidney stone pain management) 28 tablet 0     DULoxetine (CYMBALTA) 60 MG capsule [DULOXETINE (CYMBALTA) 60 MG CAPSULE] Take 60 mg by mouth daily.       famotidine (FOR PEPCID) 10 MG tablet [FAMOTIDINE (FOR PEPCID) 10 MG TABLET] Take 10 mg by mouth 2 (two) times a day.       fenofibrate (TRICOR)  145 MG tablet [FENOFIBRATE (TRICOR) 145 MG TABLET] Take 145 mg by mouth daily.       fexofenadine (ALLEGRA) 180 MG tablet [FEXOFENADINE (ALLEGRA) 180 MG TABLET] Take 180 mg by mouth daily.       fluticasone propionate (FLOVENT DISKUS) 50 mcg/actuation diskus inhaler [FLUTICASONE PROPIONATE (FLOVENT DISKUS) 50 MCG/ACTUATION DISKUS INHALER] Inhale 1 puff 2 (two) times a day.       ibuprofen (ADVIL/MOTRIN) 200 MG tablet Take 2 tablets (400 mg) by mouth every 6 hours for 7 days 56 tablet 0     lamoTRIgine (LAMICTAL) 150 MG tablet [LAMOTRIGINE (LAMICTAL) 150 MG TABLET] Take 150 mg by mouth daily.       lisinopril (PRINIVIL,ZESTRIL) 5 MG tablet [LISINOPRIL (PRINIVIL,ZESTRIL) 5 MG TABLET] Take 5 mg by mouth daily.       LORazepam (ATIVAN) 1 MG tablet [LORAZEPAM (ATIVAN) 1 MG TABLET] Take 1 mg by mouth daily as needed for anxiety (only as needed).       naproxen sodium (ALEVE) 220 MG tablet [NAPROXEN SODIUM (ALEVE) 220 MG TABLET] Take 220 mg by mouth 2 (two) times a day with meals. Patient usually takes in the morning       oxyCODONE (ROXICODONE) 5 MG tablet Take 1 tablet (5 mg) by mouth every 4 hours as needed for severe pain (7-10) If pain is not improved with acetaminophen and ibuprofen. 12 tablet 0     polyethylene glycol (MIRALAX) 17 gram packet [POLYETHYLENE GLYCOL (MIRALAX) 17 GRAM PACKET] Take 17 g by mouth daily as needed.       ranitidine (ZANTAC MAXIMUM STRENGTH) 150 MG tablet [RANITIDINE (ZANTAC MAXIMUM STRENGTH) 150 MG TABLET] 1 tab(s)       rosuvastatin (CRESTOR) 20 MG tablet [ROSUVASTATIN (CRESTOR) 20 MG TABLET] Take 20 mg by mouth at bedtime.       traMADol (ULTRAM) 50 mg tablet [TRAMADOL (ULTRAM) 50 MG TABLET] Take 50 mg by mouth every 6 (six) hours as needed for pain or other (2 daily).       traZODone (DESYREL) 100 MG tablet [TRAZODONE (DESYREL) 100 MG TABLET] Take 100 mg by mouth at bedtime. Take 2 tablets one daily         Allergies   Allergen Reactions     Atorvastatin Muscle Pain (Myalgia)      Omeprazole Other (See Comments)     Other Environmental Allergy Unknown     DUST       Social History     Socioeconomic History     Marital status: Single     Spouse name: Not on file     Number of children: Not on file     Years of education: Not on file     Highest education level: Not on file   Occupational History     Not on file   Tobacco Use     Smoking status: Former     Years: 20.00     Types: Cigarettes     Quit date: 1983     Years since quittin.0     Smokeless tobacco: Never   Substance and Sexual Activity     Alcohol use: Not Currently     Drug use: Not on file     Sexual activity: Not Currently   Other Topics Concern     Not on file   Social History Narrative     Not on file     Social Determinants of Health     Financial Resource Strain: Not on file   Food Insecurity: Not on file   Transportation Needs: Not on file   Physical Activity: Not on file   Stress: Not on file   Social Connections: Not on file   Intimate Partner Violence: Not on file   Housing Stability: Not on file       Family History   Problem Relation Age of Onset     Diabetes Mother      Depression Mother      Cancer Father      Depression Brother      Cancer Brother      Nephrolithiasis Sister      Diabetes Sister      Depression Sister        Objective:     No vitals or physical exam obtained due to virtual visit    Labs  Most Recent 3 CBC's:Recent Labs   Lab Test 22   WBC 14.6*   HGB 13.5   MCV 87        Most Recent 3 BMP's:Recent Labs   Lab Test 22  0844 22  1121    141 138   POTASSIUM 4.1 4.4 4.7   CHLORIDE 101 104 103   CO2 24 26 23   BUN 28 23.2* 19   CR 1.77* 1.30* 1.16   ANIONGAP 11 11 12   KURT 9.4 9.7 10.2   * 101* 104     Most Recent Urinalysis:Recent Labs   Lab Test 22   COLOR Light Yellow   APPEARANCE Clear   URINEGLC Negative   URINEBILI Negative   URINEKETONE Negative   SG 1.025   UBLD Negative   URINEPH 5.5   PROTEIN Negative   NITRITE Negative    LEUKEST Negative   RBCU 2   WBCU 1     Acute Labs Urine Culture  No results found for: CULTURE

## 2022-12-26 NOTE — PROGRESS NOTES
Patient is roomed via telephone for a virtual visit.  Patient confirmed he is in the [facility] at the time of this appointment.  Patient understand that this visit is billable and agree to proceed with appointment.

## 2022-12-29 ENCOUNTER — LAB REQUISITION (OUTPATIENT)
Dept: LAB | Facility: CLINIC | Age: 77
End: 2022-12-29
Payer: COMMERCIAL

## 2022-12-29 DIAGNOSIS — L98.9 DISORDER OF THE SKIN AND SUBCUTANEOUS TISSUE, UNSPECIFIED: ICD-10-CM

## 2022-12-29 PROCEDURE — 88305 TISSUE EXAM BY PATHOLOGIST: CPT | Mod: TC,ORL | Performed by: SURGERY

## 2023-01-03 LAB
PATH REPORT.COMMENTS IMP SPEC: NORMAL
PATH REPORT.COMMENTS IMP SPEC: NORMAL
PATH REPORT.FINAL DX SPEC: NORMAL
PATH REPORT.GROSS SPEC: NORMAL
PATH REPORT.MICROSCOPIC SPEC OTHER STN: NORMAL
PATH REPORT.RELEVANT HX SPEC: NORMAL
PHOTO IMAGE: NORMAL

## 2023-01-03 PROCEDURE — 88305 TISSUE EXAM BY PATHOLOGIST: CPT | Mod: 26 | Performed by: PATHOLOGY

## 2023-02-04 ENCOUNTER — HEALTH MAINTENANCE LETTER (OUTPATIENT)
Age: 78
End: 2023-02-04

## 2023-08-14 ENCOUNTER — LAB REQUISITION (OUTPATIENT)
Dept: LAB | Facility: CLINIC | Age: 78
End: 2023-08-14

## 2023-08-14 DIAGNOSIS — Z12.5 ENCOUNTER FOR SCREENING FOR MALIGNANT NEOPLASM OF PROSTATE: ICD-10-CM

## 2023-08-14 DIAGNOSIS — E78.5 HYPERLIPIDEMIA, UNSPECIFIED: ICD-10-CM

## 2023-08-14 DIAGNOSIS — I10 ESSENTIAL (PRIMARY) HYPERTENSION: ICD-10-CM

## 2023-08-14 LAB
ALBUMIN SERPL BCG-MCNC: 4.2 G/DL (ref 3.5–5.2)
ALP SERPL-CCNC: 51 U/L (ref 40–129)
ALT SERPL W P-5'-P-CCNC: 13 U/L (ref 0–70)
ANION GAP SERPL CALCULATED.3IONS-SCNC: 13 MMOL/L (ref 7–15)
AST SERPL W P-5'-P-CCNC: 21 U/L (ref 0–45)
BILIRUB SERPL-MCNC: 0.3 MG/DL
BUN SERPL-MCNC: 20.6 MG/DL (ref 8–23)
CALCIUM SERPL-MCNC: 9.9 MG/DL (ref 8.8–10.2)
CHLORIDE SERPL-SCNC: 103 MMOL/L (ref 98–107)
CHOLEST SERPL-MCNC: 137 MG/DL
CREAT SERPL-MCNC: 1.51 MG/DL (ref 0.67–1.17)
DEPRECATED HCO3 PLAS-SCNC: 24 MMOL/L (ref 22–29)
GFR SERPL CREATININE-BSD FRML MDRD: 46 ML/MIN/1.73M2
GLUCOSE SERPL-MCNC: 102 MG/DL (ref 70–99)
HDLC SERPL-MCNC: 46 MG/DL
LDLC SERPL CALC-MCNC: 66 MG/DL
MAGNESIUM SERPL-MCNC: 2.4 MG/DL (ref 1.7–2.3)
NONHDLC SERPL-MCNC: 91 MG/DL
POTASSIUM SERPL-SCNC: 4.7 MMOL/L (ref 3.4–5.3)
PROT SERPL-MCNC: 6.3 G/DL (ref 6.4–8.3)
PSA SERPL DL<=0.01 NG/ML-MCNC: 1.51 NG/ML
SODIUM SERPL-SCNC: 140 MMOL/L (ref 136–145)
TRIGL SERPL-MCNC: 124 MG/DL

## 2023-08-14 PROCEDURE — 83735 ASSAY OF MAGNESIUM: CPT | Performed by: FAMILY MEDICINE

## 2023-08-14 PROCEDURE — G0103 PSA SCREENING: HCPCS | Performed by: FAMILY MEDICINE

## 2023-08-14 PROCEDURE — 80053 COMPREHEN METABOLIC PANEL: CPT | Performed by: FAMILY MEDICINE

## 2023-08-14 PROCEDURE — 80061 LIPID PANEL: CPT | Performed by: FAMILY MEDICINE

## 2023-08-28 ENCOUNTER — HOSPITAL ENCOUNTER (OUTPATIENT)
Dept: CT IMAGING | Facility: CLINIC | Age: 78
Discharge: HOME | End: 2023-08-28
Attending: UROLOGY | Admitting: UROLOGY
Payer: COMMERCIAL

## 2023-08-28 DIAGNOSIS — N20.0 CALCULUS OF KIDNEY: ICD-10-CM

## 2023-08-28 PROCEDURE — 74176 CT ABD & PELVIS W/O CONTRAST: CPT

## 2023-11-17 ENCOUNTER — LAB REQUISITION (OUTPATIENT)
Dept: LAB | Facility: CLINIC | Age: 78
End: 2023-11-17

## 2023-11-17 DIAGNOSIS — I12.9 HYPERTENSIVE CHRONIC KIDNEY DISEASE WITH STAGE 1 THROUGH STAGE 4 CHRONIC KIDNEY DISEASE, OR UNSPECIFIED CHRONIC KIDNEY DISEASE: ICD-10-CM

## 2023-11-17 LAB
ANION GAP SERPL CALCULATED.3IONS-SCNC: 12 MMOL/L (ref 7–15)
BUN SERPL-MCNC: 19.7 MG/DL (ref 8–23)
CALCIUM SERPL-MCNC: 9.5 MG/DL (ref 8.8–10.2)
CHLORIDE SERPL-SCNC: 105 MMOL/L (ref 98–107)
CREAT SERPL-MCNC: 1.35 MG/DL (ref 0.67–1.17)
DEPRECATED HCO3 PLAS-SCNC: 24 MMOL/L (ref 22–29)
EGFRCR SERPLBLD CKD-EPI 2021: 53 ML/MIN/1.73M2
GLUCOSE SERPL-MCNC: 103 MG/DL (ref 70–99)
POTASSIUM SERPL-SCNC: 4.3 MMOL/L (ref 3.4–5.3)
SODIUM SERPL-SCNC: 141 MMOL/L (ref 135–145)

## 2023-11-17 PROCEDURE — 80048 BASIC METABOLIC PNL TOTAL CA: CPT | Performed by: FAMILY MEDICINE

## 2024-02-12 ENCOUNTER — LAB REQUISITION (OUTPATIENT)
Dept: LAB | Facility: CLINIC | Age: 79
End: 2024-02-12

## 2024-02-12 DIAGNOSIS — I12.9 HYPERTENSIVE CHRONIC KIDNEY DISEASE WITH STAGE 1 THROUGH STAGE 4 CHRONIC KIDNEY DISEASE, OR UNSPECIFIED CHRONIC KIDNEY DISEASE: ICD-10-CM

## 2024-02-12 LAB
ANION GAP SERPL CALCULATED.3IONS-SCNC: 13 MMOL/L (ref 7–15)
BUN SERPL-MCNC: 17.7 MG/DL (ref 8–23)
CALCIUM SERPL-MCNC: 10.2 MG/DL (ref 8.8–10.2)
CHLORIDE SERPL-SCNC: 106 MMOL/L (ref 98–107)
CREAT SERPL-MCNC: 1.23 MG/DL (ref 0.67–1.17)
DEPRECATED HCO3 PLAS-SCNC: 26 MMOL/L (ref 22–29)
EGFRCR SERPLBLD CKD-EPI 2021: 59 ML/MIN/1.73M2
GLUCOSE SERPL-MCNC: 104 MG/DL (ref 70–99)
POTASSIUM SERPL-SCNC: 5.3 MMOL/L (ref 3.4–5.3)
SODIUM SERPL-SCNC: 145 MMOL/L (ref 135–145)

## 2024-02-12 PROCEDURE — 80048 BASIC METABOLIC PNL TOTAL CA: CPT | Performed by: FAMILY MEDICINE

## 2024-03-02 ENCOUNTER — HEALTH MAINTENANCE LETTER (OUTPATIENT)
Age: 79
End: 2024-03-02

## 2024-08-13 ENCOUNTER — LAB REQUISITION (OUTPATIENT)
Dept: LAB | Facility: CLINIC | Age: 79
End: 2024-08-13

## 2024-08-13 DIAGNOSIS — Z12.5 ENCOUNTER FOR SCREENING FOR MALIGNANT NEOPLASM OF PROSTATE: ICD-10-CM

## 2024-08-13 DIAGNOSIS — I25.10 ATHEROSCLEROTIC HEART DISEASE OF NATIVE CORONARY ARTERY WITHOUT ANGINA PECTORIS: ICD-10-CM

## 2024-08-13 DIAGNOSIS — N18.31 CHRONIC KIDNEY DISEASE, STAGE 3A (H): ICD-10-CM

## 2024-08-13 LAB
ALBUMIN SERPL BCG-MCNC: 4.3 G/DL (ref 3.5–5.2)
ALP SERPL-CCNC: 47 U/L (ref 40–150)
ALT SERPL W P-5'-P-CCNC: 11 U/L (ref 0–70)
ANION GAP SERPL CALCULATED.3IONS-SCNC: 12 MMOL/L (ref 7–15)
AST SERPL W P-5'-P-CCNC: 18 U/L (ref 0–45)
BILIRUB SERPL-MCNC: 0.3 MG/DL
BUN SERPL-MCNC: 20.3 MG/DL (ref 8–23)
CALCIUM SERPL-MCNC: 9.6 MG/DL (ref 8.8–10.4)
CHLORIDE SERPL-SCNC: 102 MMOL/L (ref 98–107)
CHOLEST SERPL-MCNC: 160 MG/DL
CREAT SERPL-MCNC: 1.38 MG/DL (ref 0.67–1.17)
EGFRCR SERPLBLD CKD-EPI 2021: 51 ML/MIN/1.73M2
FASTING STATUS PATIENT QL REPORTED: ABNORMAL
FASTING STATUS PATIENT QL REPORTED: ABNORMAL
GLUCOSE SERPL-MCNC: 104 MG/DL (ref 70–99)
HCO3 SERPL-SCNC: 24 MMOL/L (ref 22–29)
HDLC SERPL-MCNC: 46 MG/DL
LDLC SERPL CALC-MCNC: 73 MG/DL
NONHDLC SERPL-MCNC: 114 MG/DL
POTASSIUM SERPL-SCNC: 4.2 MMOL/L (ref 3.4–5.3)
PROT SERPL-MCNC: 6.8 G/DL (ref 6.4–8.3)
PSA SERPL DL<=0.01 NG/ML-MCNC: 1.52 NG/ML
SODIUM SERPL-SCNC: 138 MMOL/L (ref 135–145)
TRIGL SERPL-MCNC: 203 MG/DL

## 2024-08-13 PROCEDURE — 80061 LIPID PANEL: CPT | Performed by: FAMILY MEDICINE

## 2024-08-13 PROCEDURE — G0103 PSA SCREENING: HCPCS | Performed by: FAMILY MEDICINE

## 2024-08-13 PROCEDURE — 80053 COMPREHEN METABOLIC PANEL: CPT | Performed by: FAMILY MEDICINE

## 2024-08-19 ENCOUNTER — LAB REQUISITION (OUTPATIENT)
Dept: LAB | Facility: CLINIC | Age: 79
End: 2024-08-19

## 2024-08-19 DIAGNOSIS — D64.9 ANEMIA, UNSPECIFIED: ICD-10-CM

## 2024-08-19 LAB
FERRITIN SERPL-MCNC: 32 NG/ML (ref 31–409)
IRON BINDING CAPACITY (ROCHE): 405 UG/DL (ref 240–430)
IRON SATN MFR SERPL: 16 % (ref 15–46)
IRON SERPL-MCNC: 63 UG/DL (ref 61–157)

## 2024-08-19 PROCEDURE — 82728 ASSAY OF FERRITIN: CPT | Performed by: FAMILY MEDICINE

## 2024-08-19 PROCEDURE — 83550 IRON BINDING TEST: CPT | Performed by: FAMILY MEDICINE

## 2025-01-30 ENCOUNTER — LAB REQUISITION (OUTPATIENT)
Dept: LAB | Facility: CLINIC | Age: 80
End: 2025-01-30
Payer: COMMERCIAL

## 2025-01-30 DIAGNOSIS — I12.9 HYPERTENSIVE CHRONIC KIDNEY DISEASE WITH STAGE 1 THROUGH STAGE 4 CHRONIC KIDNEY DISEASE, OR UNSPECIFIED CHRONIC KIDNEY DISEASE: ICD-10-CM

## 2025-01-30 LAB
ANION GAP SERPL CALCULATED.3IONS-SCNC: 14 MMOL/L (ref 7–15)
BUN SERPL-MCNC: 22.2 MG/DL (ref 8–23)
CALCIUM SERPL-MCNC: 9.9 MG/DL (ref 8.8–10.4)
CHLORIDE SERPL-SCNC: 104 MMOL/L (ref 98–107)
CREAT SERPL-MCNC: 1.31 MG/DL (ref 0.67–1.17)
EGFRCR SERPLBLD CKD-EPI 2021: 55 ML/MIN/1.73M2
GLUCOSE SERPL-MCNC: 113 MG/DL (ref 70–99)
HCO3 SERPL-SCNC: 22 MMOL/L (ref 22–29)
POTASSIUM SERPL-SCNC: 4.1 MMOL/L (ref 3.4–5.3)
SODIUM SERPL-SCNC: 140 MMOL/L (ref 135–145)

## 2025-01-30 PROCEDURE — 80048 BASIC METABOLIC PNL TOTAL CA: CPT | Mod: ORL | Performed by: FAMILY MEDICINE

## 2025-03-15 ENCOUNTER — HEALTH MAINTENANCE LETTER (OUTPATIENT)
Age: 80
End: 2025-03-15

## 2025-08-15 ENCOUNTER — LAB REQUISITION (OUTPATIENT)
Dept: LAB | Facility: CLINIC | Age: 80
End: 2025-08-15
Payer: COMMERCIAL

## 2025-08-15 DIAGNOSIS — Z12.5 ENCOUNTER FOR SCREENING FOR MALIGNANT NEOPLASM OF PROSTATE: ICD-10-CM

## 2025-08-15 DIAGNOSIS — I12.9 HYPERTENSIVE CHRONIC KIDNEY DISEASE WITH STAGE 1 THROUGH STAGE 4 CHRONIC KIDNEY DISEASE, OR UNSPECIFIED CHRONIC KIDNEY DISEASE: ICD-10-CM

## 2025-08-15 DIAGNOSIS — E78.1 PURE HYPERGLYCERIDEMIA: ICD-10-CM

## 2025-08-15 LAB
ANION GAP SERPL CALCULATED.3IONS-SCNC: 13 MMOL/L (ref 7–15)
BUN SERPL-MCNC: 22.3 MG/DL (ref 8–23)
CALCIUM SERPL-MCNC: 9.5 MG/DL (ref 8.8–10.4)
CHLORIDE SERPL-SCNC: 104 MMOL/L (ref 98–107)
CHOLEST SERPL-MCNC: 167 MG/DL
CREAT SERPL-MCNC: 1.29 MG/DL (ref 0.67–1.17)
EGFRCR SERPLBLD CKD-EPI 2021: 55 ML/MIN/1.73M2
FASTING STATUS PATIENT QL REPORTED: ABNORMAL
FASTING STATUS PATIENT QL REPORTED: ABNORMAL
GLUCOSE SERPL-MCNC: 103 MG/DL (ref 70–99)
HCO3 SERPL-SCNC: 22 MMOL/L (ref 22–29)
HDLC SERPL-MCNC: 41 MG/DL
LDLC SERPL CALC-MCNC: 88 MG/DL
NONHDLC SERPL-MCNC: 126 MG/DL
POTASSIUM SERPL-SCNC: 4.3 MMOL/L (ref 3.4–5.3)
PSA SERPL DL<=0.01 NG/ML-MCNC: 2.24 NG/ML
SODIUM SERPL-SCNC: 139 MMOL/L (ref 135–145)
TRIGL SERPL-MCNC: 192 MG/DL

## 2025-08-15 PROCEDURE — 80048 BASIC METABOLIC PNL TOTAL CA: CPT | Mod: ORL | Performed by: STUDENT IN AN ORGANIZED HEALTH CARE EDUCATION/TRAINING PROGRAM

## 2025-08-15 PROCEDURE — G0103 PSA SCREENING: HCPCS | Mod: ORL | Performed by: STUDENT IN AN ORGANIZED HEALTH CARE EDUCATION/TRAINING PROGRAM

## 2025-08-15 PROCEDURE — 80061 LIPID PANEL: CPT | Mod: ORL | Performed by: STUDENT IN AN ORGANIZED HEALTH CARE EDUCATION/TRAINING PROGRAM
